# Patient Record
Sex: MALE | Race: WHITE | Employment: FULL TIME | URBAN - NONMETROPOLITAN AREA
[De-identification: names, ages, dates, MRNs, and addresses within clinical notes are randomized per-mention and may not be internally consistent; named-entity substitution may affect disease eponyms.]

---

## 2017-10-01 ENCOUNTER — APPOINTMENT (OUTPATIENT)
Dept: GENERAL RADIOLOGY | Age: 37
End: 2017-10-01

## 2017-10-01 ENCOUNTER — HOSPITAL ENCOUNTER (EMERGENCY)
Age: 37
Discharge: HOME | End: 2017-10-01

## 2017-10-01 VITALS
DIASTOLIC BLOOD PRESSURE: 72 MMHG | WEIGHT: 165 LBS | SYSTOLIC BLOOD PRESSURE: 123 MMHG | RESPIRATION RATE: 18 BRPM | OXYGEN SATURATION: 100 % | HEIGHT: 68 IN | BODY MASS INDEX: 25.01 KG/M2 | TEMPERATURE: 98.3 F | HEART RATE: 65 BPM

## 2017-10-01 DIAGNOSIS — S62.336A CLOSED FRACTURE OF NECK OF FIFTH METACARPAL BONE OF RIGHT HAND, INITIAL ENCOUNTER: Primary | ICD-10-CM

## 2017-10-01 PROCEDURE — 99283 EMERGENCY DEPT VISIT LOW MDM: CPT

## 2017-10-01 PROCEDURE — 29125 APPL SHORT ARM SPLINT STATIC: CPT

## 2017-10-01 PROCEDURE — 6370000000 HC RX 637 (ALT 250 FOR IP): Performed by: PHYSICIAN ASSISTANT

## 2017-10-01 PROCEDURE — 73130 X-RAY EXAM OF HAND: CPT

## 2017-10-01 RX ORDER — HYDROCODONE BITARTRATE AND ACETAMINOPHEN 5; 325 MG/1; MG/1
1 TABLET ORAL ONCE
Status: COMPLETED | OUTPATIENT
Start: 2017-10-01 | End: 2017-10-01

## 2017-10-01 RX ORDER — HYDROCODONE BITARTRATE AND ACETAMINOPHEN 5; 325 MG/1; MG/1
1 TABLET ORAL EVERY 6 HOURS PRN
Qty: 10 TABLET | Refills: 0 | Status: SHIPPED | OUTPATIENT
Start: 2017-10-01

## 2017-10-01 RX ADMIN — HYDROCODONE BITARTRATE AND ACETAMINOPHEN 1 TABLET: 5; 325 TABLET ORAL at 14:21

## 2017-10-01 ASSESSMENT — PAIN DESCRIPTION - PAIN TYPE: TYPE: ACUTE PAIN

## 2017-10-01 ASSESSMENT — ENCOUNTER SYMPTOMS
EYE DISCHARGE: 0
DIARRHEA: 0
EYE REDNESS: 0
NAUSEA: 0
ABDOMINAL PAIN: 0
VOMITING: 0
SORE THROAT: 0
COUGH: 0
WHEEZING: 0
SHORTNESS OF BREATH: 0
BACK PAIN: 0
RHINORRHEA: 0

## 2017-10-01 ASSESSMENT — PAIN DESCRIPTION - FREQUENCY: FREQUENCY: CONTINUOUS

## 2017-10-01 ASSESSMENT — PAIN DESCRIPTION - ORIENTATION: ORIENTATION: RIGHT

## 2017-10-01 ASSESSMENT — PAIN DESCRIPTION - DESCRIPTORS: DESCRIPTORS: THROBBING;SHARP

## 2017-10-01 ASSESSMENT — PAIN DESCRIPTION - LOCATION: LOCATION: HAND

## 2017-10-01 ASSESSMENT — PAIN SCALES - GENERAL: PAINLEVEL_OUTOF10: 5

## 2017-10-01 NOTE — ED AVS SNAPSHOT
After Visit Summary  (Discharge Instructions)    Medication List for Home    Based on the information you provided to us as well as any changes during this visit, the following is your updated medication list.  Compare this with your prescription bottles at home. If you have any questions or concerns, contact your primary care physician's office. Daily Medication List (This medication list can be shared with any Healthcare provider who is helping you manage your medications)      There are NEW medications for you. START taking them after you leave the hospital     HYDROcodone-acetaminophen 5-325 MG per tablet   Commonly known as:  NORCO   Take 1 tablet by mouth every 6 hours as needed for Pain  WARNING: This medication may make you drowsy. Do not operate heavy machinery or motor vehicles during use. Herman [place] your doctor about these medications if you have questions     lurasidone 40 MG Tabs tablet   Commonly known as:  LATUDA   Take 1 tablet by mouth daily. naproxen 500 MG tablet   Commonly known as:  NAPROSYN   Take 1 tablet by mouth 2 times daily for 14 doses       OXcarbazepine 600 MG tablet   Commonly known as:  TRILEPTAL   Take 1 tablet by mouth 2 times daily. traZODone 100 MG tablet   Commonly known as:  DESYREL   Take 1 tablet by mouth nightly. venlafaxine 150 MG extended release capsule   Commonly known as:  EFFEXOR XR   Take 1 capsule by mouth daily (with breakfast). Where to Get Your Medications      You can get these medications from any pharmacy     Bring a paper prescription for each of these medications     HYDROcodone-acetaminophen 5-325 MG per tablet               Allergies as of 10/1/2017        Reactions    Aspirin Anaphylaxis    Penicillins Anaphylaxis    Tramadol Nausea And Vomiting      Immunizations as of 10/1/2017     No immunizations on file. After Visit Summary    This summary was created for you. Thank you for entrusting your care to us. The following information includes details about your hospital/visit stay along with steps you should take to help with your recovery once you leave the hospital.  In this packet, you will find information about the topics listed below:    · Instructions about your medications including a list of your home medications  · A summary of your hospital visit  · Follow-up appointments once you have left the hospital  · Your care plan at home      You may receive a survey regarding the care you received during your stay. Your input is valuable to us. We encourage you to complete and return your survey in the envelope provided. We hope you will choose us in the future for your healthcare needs. Patient Information     Patient Name JULIO Cleaning 1980      Care Provided at:     Name Address Phone       [address] [phone]            Your Visit    Here you will find information about your visit, including the reason for your visit. Please take this sheet with you when you visit your doctor or other health care provider in the future. It will help determine the best possible medical care for you at that time. If you have any questions once you leave the hospital, please call the department phone number listed below. Diagnoses this visit     Your diagnosis was CLOSED FRACTURE OF NECK OF FIFTH METACARPAL BONE OF RIGHT HAND, INITIAL ENCOUNTER. Visit Information     Date of Visit Department Dept Phone    10/1/2017 [facility] EMERGENCY DEPT [phone]      You were seen by     You were seen by Jenna Huff PA-C. Follow-up Appointments    Below is a list of your follow-up and future appointments. This may not be a complete list as you may have made appointments directly with providers that we are not aware of or your providers may have made some for you. Please call your providers to confirm appointments. It is important to keep your appointments. Please bring your current insurance card, photo ID, co-pay, and all medication bottles to your appointment. If self-pay, payment is expected at the time of service. Follow-up Information     Follow up with [address] In 1 day. Why:  Appointment scheduled for Monday, 10-2-17 at 12:30 PM    Contact information:    [address]        Follow up with [address] [facility] [address] [address] EMERGENCY DEPT. Specialty:  Emergency Medicine    Why:  If symptoms worsen    Contact information:    [phone]      Future Appointments     10/2/2017 12:30 PM     Appointment with SCHEDULE, CHIQUI RAMOS at [address] ([phone])   Please arrive 15 minutes prior to appointment time, bring insurance card and photo ID. [facility]         Preventive Care        Date Due    HIV screening is recommended for all people regardless of risk factors  aged 15-65 years at least once (lifetime) who have never been HIV tested. 5/8/1995    Tetanus Combination Vaccine (1 - Tdap) 5/8/1999    Pneumococcal Vaccine - Pneumovax for adults aged 19-64 years with: chronic heart disease, chronic lung disease, diabetes mellitus, alcoholism, chronic liver disease, or cigarette smoking. (1 of 1 - PPSV23) 5/8/1999    Yearly Flu Vaccine (1) 9/1/2017                 Care Plan Once You Return Home    This section includes instructions you will need to follow once you leave the hospital.  Your care team will discuss these with you, so you and those caring for you know how to best care for your health needs at home. This section may also include educational information about certain health topics that may be of help to you.           Important Information if you smoke or are exposed to smoking not need to use this code after youve completed the sign-up process. If you do not sign up before the expiration date, you must request a new code. [employer] Access Code: [email or web address]  Expires: 11/30/2017  4:12 PM    4. Enter your Social Security Number ([id number]) and Date of Birth (mm/dd/yyyy) as indicated and click Submit. You will be taken to the next sign-up page. 5. Create a [employer] ID. This will be your [employer] login ID and cannot be changed, so think of one that is secure and easy to remember. 6. Create a [employer] password. You can change your password at any time. 7. Enter your Password Reset Question and Answer. This can be used at a later time if you forget your password. 8. Enter your e-mail address. You will receive e-mail notification when new information is available in [address] 9. Click Sign Up. You can now view your medical record. Additional Information  If you have questions, please contact the physician practice where you receive care. Remember, [employer] is NOT to be used for urgent needs. For medical emergencies, dial 911. For questions regarding your [employer] account call [phone]. If you have a clinical question, please call your doctor's office. View your information online  ? Review your current list of  medications, immunization, and allergies. ? Review your future test results online . ? Review your discharge instructions provided by your caregivers at discharge    Certain functionality such as prescription refills, scheduling appointments or sending messages to your provider are not activated if your provider does not use [employer] in his/her office    For questions regarding your [employer] account call [phone]. If you have a clinical question, please call your doctor's office.          The information on all pages of the After Visit Summary has been reviewed with me, the patient and/or responsible adult, by my health care provider(s). I had the opportunity to ask questions regarding this information. I understand I should dispose of my armband safely at home to protect my health information. A complete copy of the After Visit Summary has been given to me, the patient and/or responsible adult. Patient Signature/Responsible Adult: ___________________________________    Nurse Signature: ___________________________________  Resident/MLP Signature: ___________________________________  Attending Signature: ___________________________________    Date:____________Time:____________              Discharge Instructions            Broken Hand: Care Instructions  Your Care Instructions  A hand can break (fracture) during sports, a fall, or other accidents. The break may happen when your hand twists, is hit, or is used to protect you in a fall. Fractures can range from a small, hairline crack, to a bone or bones broken into two or more pieces. Your treatment depends on how bad the break is. Your doctor may have put your hand in a brace, splint, or cast to allow it to heal or to keep it stable until you see another doctor. It may take weeks or months for your hand to heal. You can help it heal with some care at home. You heal best when you take good care of yourself. Eat a variety of healthy foods, and don't smoke. Follow-up care is a key part of your treatment and safety. Be sure to make and go to all appointments, and call your doctor if you are having problems. It's also a good idea to know your test results and keep a list of the medicines you take. How can you care for yourself at home? · Put ice or a cold pack on your hand for 10 to 20 minutes at a time. Try to do this every 1 to 2 hours for the next 3 days (when you are awake). Put a thin cloth between the ice and your cast or splint. Keep your cast or splint dry. · Follow the cast care instructions your doctor gives you.  If you have a
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
## 2022-09-24 ENCOUNTER — HOSPITAL ENCOUNTER (EMERGENCY)
Age: 42
Discharge: HOME | End: 2022-09-24
Attending: EMERGENCY MEDICINE
Payer: COMMERCIAL

## 2022-09-24 VITALS
SYSTOLIC BLOOD PRESSURE: 121 MMHG | TEMPERATURE: 98.3 F | BODY MASS INDEX: 28.72 KG/M2 | RESPIRATION RATE: 20 BRPM | OXYGEN SATURATION: 94 % | WEIGHT: 183 LBS | DIASTOLIC BLOOD PRESSURE: 71 MMHG | HEART RATE: 85 BPM | HEIGHT: 67 IN

## 2022-09-24 DIAGNOSIS — S39.011A STRAIN OF MUSCLE OF RIGHT GROIN REGION: Primary | ICD-10-CM

## 2022-09-24 PROCEDURE — 99283 EMERGENCY DEPT VISIT LOW MDM: CPT

## 2022-09-24 PROCEDURE — 6370000000 HC RX 637 (ALT 250 FOR IP): Performed by: STUDENT IN AN ORGANIZED HEALTH CARE EDUCATION/TRAINING PROGRAM

## 2022-09-24 RX ORDER — IBUPROFEN 200 MG
600 TABLET ORAL ONCE
Status: COMPLETED | OUTPATIENT
Start: 2022-09-24 | End: 2022-09-24

## 2022-09-24 RX ADMIN — IBUPROFEN 600 MG: 200 TABLET, FILM COATED ORAL at 18:02

## 2022-09-24 ASSESSMENT — ENCOUNTER SYMPTOMS
ABDOMINAL PAIN: 0
NAUSEA: 0
BACK PAIN: 0
COUGH: 0
SINUS PAIN: 0
EYE REDNESS: 0
SORE THROAT: 0
SHORTNESS OF BREATH: 0
RHINORRHEA: 0
DIARRHEA: 0
VOMITING: 0

## 2022-09-24 NOTE — Clinical Note
Leopold Kinder was seen and treated in our emergency department on 9/24/2022. He may return to work on 09/25/2022. If you have any questions or concerns, please don't hesitate to call.       Nikolay Cortes MD

## 2022-09-24 NOTE — ED NOTES
Patient to ED after pulling a groin muscle while at work yesterday      Patel Mota [facility]  09/24/22 6199

## 2022-09-24 NOTE — ED PROVIDER NOTES
[place] ENCOUNTER          Pt Name: Karthik Montgomery  MRN: [id number]  [place] 1980  Date of evaluation: 9/24/2022  Treating Resident Physician: Ousmane Ybarra MD  Supervising Physician: Dr Alia Velasquez       Chief Complaint   Patient presents with    Groin Pain     History obtained from the patient. HISTORY OF PRESENT ILLNESS    HPI  Karthik Montgomery is a 43 y.o. male who presents to the emergency department for evaluation of right groin strain. Patient states he was lifting oral pain yesterday and strained right of his groin. He is has some pain with exertion with twisting movements. Denies any nausea vomiting diarrhea. Denies any testicular pain penile swelling penile discharge. The patient has no other acute complaints at this time. REVIEW OF SYSTEMS   Review of Systems   Constitutional:  Negative for chills and fever. HENT:  Negative for rhinorrhea, sinus pain and sore throat. Eyes:  Negative for redness. Respiratory:  Negative for cough and shortness of breath. Cardiovascular:  Negative for chest pain. Gastrointestinal:  Negative for abdominal pain, diarrhea, nausea and vomiting. Genitourinary:  Negative for dysuria. Musculoskeletal:  Negative for back pain. Skin:  Negative for rash. Neurological:  Negative for light-headedness and headaches. Psychiatric/Behavioral:  Negative for agitation. PAST MEDICAL AND SURGICAL HISTORY     Past Medical History:   Diagnosis Date    Psychiatric problem      No past surgical history on file. MEDICATIONS     Current Facility-Administered Medications:     ibuprofen (ADVIL;MOTRIN) tablet 600 mg, 600 mg, Oral, Once, Ousmane Ybarra MD    Current Outpatient Medications:     HYDROcodone-acetaminophen (NORCO) 5-325 MG per tablet, Take 1 tablet by mouth every 6 hours as needed for Pain  WARNING: This medication may make you drowsy.  Do not operate heavy machinery or motor vehicles during use. ., Disp: 10 tablet, Rfl: 0    naproxen (NAPROSYN) 500 MG tablet, Take 1 tablet by mouth 2 times daily for 14 doses, Disp: 14 tablet, Rfl: 0    OXcarbazepine (TRILEPTAL) 600 MG tablet, Take 1 tablet by mouth 2 times daily. , Disp: 60 tablet, Rfl: 0    traZODone (DESYREL) 100 MG tablet, Take 1 tablet by mouth nightly., Disp: 30 tablet, Rfl: 0    venlafaxine (EFFEXOR-XR) 150 MG XR capsule, Take 1 capsule by mouth daily (with breakfast). , Disp: 30 capsule, Rfl: 0    lurasidone (LATUDA) 40 MG TABS tablet, Take 1 tablet by mouth daily. , Disp: 30 tablet, Rfl: 0      SOCIAL HISTORY     Social History     Social History Narrative    Not on file     Social History     Tobacco Use    Smoking status: Every Day     Packs/day: 1.00     Years: 20.00     Pack years: 20.00     Types: Cigarettes   Substance Use Topics    Alcohol use: Yes     Comment: socially    Drug use: No         ALLERGIES     Allergies   Allergen Reactions    Aspirin Anaphylaxis    Penicillins Anaphylaxis    Tramadol Nausea And Vomiting         FAMILY HISTORY   No family history on file. PREVIOUS RECORDS   Previous records reviewed: Patient seen on 10/1/2017 for closed fracture of the neck of the fifth metacarpal bone of the right hand. PHYSICAL EXAM     ED Triage Vitals [09/24/22 1741]   BP Temp Temp Source Heart Rate Resp SpO2 Height Weight   121/71 98.3 °F (36.8 °C) Oral 85 20 94 % 5' 7\" (1.702 m) 183 lb (83 kg)     Initial vital signs and nursing assessment reviewed and normal. Pulsoximetry is normal per my interpretation. Additional Vital Signs:  Vitals:    09/24/22 1741   BP: 121/71   Pulse: 85   Resp: 20   Temp: 98.3 °F (36.8 °C)   SpO2: 94%       Physical Exam  Vitals and nursing note reviewed. Exam conducted with a chaperone present. Constitutional:       General: He is not in acute distress. Appearance: Normal appearance. He is not ill-appearing, toxic-appearing or diaphoretic. HENT:      Head: Normocephalic and atraumatic. Right Ear: External ear normal.      Left Ear: External ear normal.      Nose: Nose normal.      Mouth/Throat:      Mouth: Mucous membranes are moist.      Pharynx: Oropharynx is clear. Eyes:      General: No scleral icterus. Conjunctiva/sclera: Conjunctivae normal.   Cardiovascular:      Rate and Rhythm: Normal rate and regular rhythm. Pulses: Normal pulses. Heart sounds: Normal heart sounds. Pulmonary:      Effort: Pulmonary effort is normal. No respiratory distress. Breath sounds: Normal breath sounds. Abdominal:      General: Abdomen is flat. There is no distension. Palpations: Abdomen is soft. Tenderness: There is abdominal tenderness. There is no guarding or rebound. Hernia: There is no hernia in the left inguinal area or right inguinal area. Comments: Tenderness to the right inguinal region   Genitourinary:     Penis: Normal.       Testes: Normal.         Right: Mass, tenderness, swelling, testicular hydrocele or varicocele not present. Right testis is descended. Cremasteric reflex is present. Left: Mass, tenderness, swelling, testicular hydrocele or varicocele not present. Left testis is descended. Cremasteric reflex is present. Epididymis:      Right: Normal.      Left: Normal.      Rectum: Normal.   Musculoskeletal:         General: Normal range of motion. Cervical back: Normal range of motion and neck supple. No rigidity. No muscular tenderness. Lymphadenopathy:      Cervical: No cervical adenopathy. Skin:     General: Skin is warm and dry. Capillary Refill: Capillary refill takes less than 2 seconds. Coloration: Skin is not jaundiced. Neurological:      General: No focal deficit present. Mental Status: He is alert and oriented to person, place, and time.    Psychiatric:         Mood and Affect: Mood normal.         Behavior: Behavior normal.           MEDICAL DECISION MAKING      Differential Diagnosis Considered but not limited to:   Muscle strain, hernia        Assessment:   Patient has no signs of hernia on examination, normal testicular exam.  He has no systemic symptoms and is well-appearing he is advised take Motrin Tylenol for pain control as well as use heat for his musculoskeletal strain. He was his family physician next 2 days for further evaluation. ED RESULTS   Laboratory results:  Labs Reviewed - No data to display    Radiologic studies results:  No orders to display       ED Medications administered this visit:   Medications   ibuprofen (ADVIL;MOTRIN) tablet 600 mg (has no administration in time range)         ED COURSE      Strict return precautions and follow up instructions were discussed with the patient prior to discharge, with which the patient agrees. MEDICATION CHANGES     New Prescriptions    No medications on file         FINAL DISPOSITION     Final diagnoses:   Strain of muscle of right groin region     Condition: condition: good  Dispo: Discharge to home      This transcription was electronically signed. Parts of this transcriptions may have been dictated by use of voice recognition software and electronically transcribed, and parts may have been transcribed with the assistance of an ED scribe. The transcription may contain errors not detected in proofreading. Please refer to my supervising physician's documentation if my documentation differs.     Electronically Signed: Joe Morrissey MD, 09/24/22, 6:02 PM         Joe Morrissey MD  Resident  09/24/22 7754

## 2022-09-24 NOTE — DISCHARGE INSTRUCTIONS
You may take Motrin every 6 hours Tylenol every 6 hours as needed for pain control. Use heat to the affected area. Follow-up with primary care physician next 2 days for further evaluation. Return to the emerged department for any new or worsening symptoms.

## 2022-11-26 ENCOUNTER — HOSPITAL ENCOUNTER (EMERGENCY)
Age: 42
Discharge: HOME | End: 2022-11-26

## 2022-11-26 ENCOUNTER — APPOINTMENT (OUTPATIENT)
Dept: GENERAL RADIOLOGY | Age: 42
End: 2022-11-26

## 2022-11-26 VITALS
RESPIRATION RATE: 16 BRPM | OXYGEN SATURATION: 96 % | HEART RATE: 73 BPM | SYSTOLIC BLOOD PRESSURE: 126 MMHG | TEMPERATURE: 97.9 F | DIASTOLIC BLOOD PRESSURE: 78 MMHG

## 2022-11-26 DIAGNOSIS — M79.672 LEFT FOOT PAIN: ICD-10-CM

## 2022-11-26 DIAGNOSIS — S60.352A FOREIGN BODY OF LEFT THUMB, INITIAL ENCOUNTER: Primary | ICD-10-CM

## 2022-11-26 PROCEDURE — 73630 X-RAY EXAM OF FOOT: CPT

## 2022-11-26 PROCEDURE — 73140 X-RAY EXAM OF FINGER(S): CPT

## 2022-11-26 PROCEDURE — 99283 EMERGENCY DEPT VISIT LOW MDM: CPT

## 2022-11-26 ASSESSMENT — PAIN SCALES - GENERAL: PAINLEVEL_OUTOF10: 5

## 2022-11-26 ASSESSMENT — PAIN - FUNCTIONAL ASSESSMENT: PAIN_FUNCTIONAL_ASSESSMENT: 0-10

## 2022-11-26 NOTE — Clinical Note
Bartolo Smith was seen and treated in our emergency department on 11/26/2022. He may return to work on 11/29/2022. If you have any questions or concerns, please don't hesitate to call.       Joyce Mccrary PA-C

## 2022-11-26 NOTE — ED TRIAGE NOTES
Pt to the ED for possible glass in left thumb and lump on left foot. States the lump on his foot has been there 4-5 months. caffeine

## 2022-11-27 NOTE — ED PROVIDER NOTES
[facility]  eMERGENCY dEPARTMENT eNCOUnter          [address]       Chief Complaint   Patient presents with    Finger Pain    Foot Pain       Nurses Notes reviewed and I agree except as noted inthe HPI. HISTORY OF PRESENT ILLNESS    Camacho Dos Santos is a 43 y.o. male who presents to the Emergency Department for the evaluation of possible foreign body in the left thumb as well as some chronic pain in the left foot. Patient states that 3 weeks ago he was picking some trash up off of the floor and sustained a laceration to his left thumb. States he question whether there was glass in it at the time but he could not feel any when he rubbed his hand over the area. He will allow the area to heal but has developed pain with any pressure to the area which he describes as a stabbing sensation as if there is a foreign body present. 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 No swelling, fevers, drainage from the area. He is concerned there might be glass in the wound. He also complains of some chronic pain in the right foot which has been present for the past 4 to 5 months. He states he has developed a knot in the area. It did start after he got some new work boots which are steel toed boots. After the onset of the knot, he did get a bigger size shoe but the area has not resolved. States he has no change in his symptoms today but just wants it looked at. He has not tried anything for home treatment of either condition. Denies established PCP. The HPI was provided by the patient. REVIEW OF SYSTEMS     Review of Systems   Musculoskeletal:  Positive for arthralgias. Neurological:  Negative for weakness and numbness. All other systems reviewed and are negative. PAST MEDICAL HISTORY    has a past medical history of Psychiatric problem.     SURGICAL HISTORY      has no past surgical history on file. CURRENT MEDICATIONS       Discharge Medication List as of 11/26/2022  3:53 PM        CONTINUE these medications which have NOT CHANGED    Details   HYDROcodone-acetaminophen (NORCO) 5-325 MG per tablet Take 1 tablet by mouth every 6 hours as needed for Pain  WARNING: This medication may make you drowsy. Do not operate heavy machinery or motor vehicles during use. ., Disp-10 tablet, R-0Print      naproxen (NAPROSYN) 500 MG tablet Take 1 tablet by mouth 2 times daily for 14 doses, Disp-14 tablet, R-0      OXcarbazepine (TRILEPTAL) 600 MG tablet Take 1 tablet by mouth 2 times daily. , Disp-60 tablet, R-0      traZODone (DESYREL) 100 MG tablet Take 1 tablet by mouth nightly., Disp-30 tablet, R-0      venlafaxine (EFFEXOR-XR) 150 MG XR capsule Take 1 capsule by mouth daily (with breakfast). , Disp-30 capsule, R-0      lurasidone (LATUDA) 40 MG TABS tablet Take 1 tablet by mouth daily. , Disp-30 tablet, R-0             ALLERGIES     is allergic to aspirin, penicillins, and tramadol. FAMILY HISTORY     has no family status information on file. family history is not on file. SOCIAL HISTORY      reports that he has been smoking cigarettes. He has a 20.00 pack-year smoking history. He does not have any smokeless tobacco history on file. He reports current alcohol use. He reports that he does not use drugs. PHYSICAL EXAM     INITIAL VITALS:  oral temperature is 97.9 °F (36.6 °C). His blood pressure is 126/78 and his pulse is 73. His respiration is 16 and oxygen saturation is 96%. Physical Exam  Vitals and nursing note reviewed. Constitutional:       Appearance: Normal appearance. HENT:      Head: Normocephalic and atraumatic. Eyes:      Conjunctiva/sclera: Conjunctivae normal.   Cardiovascular:      Rate and Rhythm: Normal rate. Pulmonary:      Effort: Pulmonary effort is normal. No respiratory distress. Musculoskeletal:      Cervical back: Normal range of motion. Comments: Subtle scar noted adjacent to the nail on the left thumb with localized tenderness to palpation but no erythema, warmth, swelling or decreased range of motion. There is a well circumferential 2.5 cm area of swelling along the dorsal aspect of the proximal left first metatarsal with very minimal tenderness. Mild erythema. No warmth. Skin:     General: Skin is warm and dry. Neurological:      General: No focal deficit present. Mental Status: He is alert. Psychiatric:         Mood and Affect: Mood normal.       DIFFERENTIAL DIAGNOSIS:   Differential diagnoses are discussed    DIAGNOSTIC RESULTS     EKG: All EKG's are interpreted by the Emergency Department Physician who either signs or Co-signsthis chart in the absence of a cardiologist.          RADIOLOGY: non-plain film images(s) such as CT, Ultrasound and MRI are read by the radiologist.    XR FOOT LEFT (MIN 3 VIEWS)   Final Result      No acute fracture or dislocation. Final report electronically signed by Dr. Clifford Sanderson on 11/26/2022 2:38 PM      XR FINGER LEFT (MIN 2 VIEWS)   Final Result   1. No fracture or dislocation. 2. Radiopaque foreign body as above. Final report electronically signed by Dr. Clifford Sanderson on 11/26/2022 2:36 PM          LABS:    Labs Reviewed - No data to display    EMERGENCY DEPARTMENT COURSE:   Vitals:    Vitals:    11/26/22 1356   BP: 126/78   Pulse: 73   Resp: 16   Temp: 97.9 °F (36.6 °C)   TempSrc: Oral   SpO2: 96%      6:59 AM EST: The patient was seen and evaluated. Patient presents with reassuring vital signs for complaints of some chronic pain and swelling of the left foot as well as sensation of foreign body in the left thumb. X-ray of the left thumb does show radiopaque foreign body consistent with the reported glass. Subjacent to the head of the distal phalanx. Left foot x-ray unremarkable. Results discussed with the patient.   Discussed lack of emergent procedural indication at this time. Recommend follow-up as an outpatient with orthopedics for further management. Recommend RICE for supportive care and recommend increased padding in his footwear to prevent additional friction injury. At this time there is no indication for antibiotic treatment as the patient is 3 weeks out from the time of injury with no current sign of infection. Patient is agreeable with the above plan and denied further needs upon discharge. CRITICAL CARE:   None     CONSULTS:  None    PROCEDURES:  None    FINAL IMPRESSION      1. Foreign body of left thumb, initial encounter    2. Left foot pain          DISPOSITION/PLAN   Discharge    PATIENT REFERRED TO:  [address]  [place] [address] [address] [facility] [phone]  In 2 days  Walk-in clinic open 7:30 AM-3:30 PM    [facility] EMERGENCY DEPT  [facility]  [phone]    If symptoms worsen      DISCHARGEMEDICATIONS:  Discharge Medication List as of 11/26/2022  3:53 PM          (Please note that portions of this note were completedwith a voice recognition program.  Efforts were made to edit the dictations but occasionally words are mis-transcribed.)        Shawn Hopkins PA-C  11/27/22 2514

## 2023-02-03 ENCOUNTER — OFFICE VISIT (OUTPATIENT)
Dept: FAMILY MEDICINE CLINIC | Age: 43
End: 2023-02-03

## 2023-02-03 VITALS
BODY MASS INDEX: 26.71 KG/M2 | RESPIRATION RATE: 18 BRPM | HEART RATE: 66 BPM | OXYGEN SATURATION: 96 % | HEIGHT: 67 IN | TEMPERATURE: 97.8 F | WEIGHT: 170.2 LBS | SYSTOLIC BLOOD PRESSURE: 122 MMHG | DIASTOLIC BLOOD PRESSURE: 82 MMHG

## 2023-02-03 DIAGNOSIS — R22.42 MASS OF LEFT FOOT: ICD-10-CM

## 2023-02-03 DIAGNOSIS — F31.81 BIPOLAR II DISORDER (HCC): ICD-10-CM

## 2023-02-03 DIAGNOSIS — M25.531 PAIN IN BOTH WRISTS: ICD-10-CM

## 2023-02-03 DIAGNOSIS — M25.532 PAIN IN BOTH WRISTS: ICD-10-CM

## 2023-02-03 DIAGNOSIS — Z76.89 ENCOUNTER TO ESTABLISH CARE: Primary | ICD-10-CM

## 2023-02-03 DIAGNOSIS — F17.200 SMOKING ADDICTION: ICD-10-CM

## 2023-02-03 SDOH — ECONOMIC STABILITY: FOOD INSECURITY: WITHIN THE PAST 12 MONTHS, THE FOOD YOU BOUGHT JUST DIDN'T LAST AND YOU DIDN'T HAVE MONEY TO GET MORE.: NEVER TRUE

## 2023-02-03 SDOH — ECONOMIC STABILITY: HOUSING INSECURITY
IN THE LAST 12 MONTHS, WAS THERE A TIME WHEN YOU DID NOT HAVE A STEADY PLACE TO SLEEP OR SLEPT IN A SHELTER (INCLUDING NOW)?: NO

## 2023-02-03 SDOH — ECONOMIC STABILITY: FOOD INSECURITY: WITHIN THE PAST 12 MONTHS, YOU WORRIED THAT YOUR FOOD WOULD RUN OUT BEFORE YOU GOT MONEY TO BUY MORE.: NEVER TRUE

## 2023-02-03 SDOH — ECONOMIC STABILITY: INCOME INSECURITY: HOW HARD IS IT FOR YOU TO PAY FOR THE VERY BASICS LIKE FOOD, HOUSING, MEDICAL CARE, AND HEATING?: NOT HARD AT ALL

## 2023-02-03 ASSESSMENT — PATIENT HEALTH QUESTIONNAIRE - PHQ9
9. THOUGHTS THAT YOU WOULD BE BETTER OFF DEAD, OR OF HURTING YOURSELF: 0
6. FEELING BAD ABOUT YOURSELF - OR THAT YOU ARE A FAILURE OR HAVE LET YOURSELF OR YOUR FAMILY DOWN: 0
SUM OF ALL RESPONSES TO PHQ9 QUESTIONS 1 & 2: 0
SUM OF ALL RESPONSES TO PHQ QUESTIONS 1-9: 6
SUM OF ALL RESPONSES TO PHQ QUESTIONS 1-9: 6
4. FEELING TIRED OR HAVING LITTLE ENERGY: 0
SUM OF ALL RESPONSES TO PHQ QUESTIONS 1-9: 6
7. TROUBLE CONCENTRATING ON THINGS, SUCH AS READING THE NEWSPAPER OR WATCHING TELEVISION: 0
8. MOVING OR SPEAKING SO SLOWLY THAT OTHER PEOPLE COULD HAVE NOTICED. OR THE OPPOSITE, BEING SO FIGETY OR RESTLESS THAT YOU HAVE BEEN MOVING AROUND A LOT MORE THAN USUAL: 1
3. TROUBLE FALLING OR STAYING ASLEEP: 3
5. POOR APPETITE OR OVEREATING: 2
2. FEELING DOWN, DEPRESSED OR HOPELESS: 0
1. LITTLE INTEREST OR PLEASURE IN DOING THINGS: 0
SUM OF ALL RESPONSES TO PHQ QUESTIONS 1-9: 6
10. IF YOU CHECKED OFF ANY PROBLEMS, HOW DIFFICULT HAVE THESE PROBLEMS MADE IT FOR YOU TO DO YOUR WORK, TAKE CARE OF THINGS AT HOME, OR GET ALONG WITH OTHER PEOPLE: 1

## 2023-02-03 NOTE — PROGRESS NOTES
[address]   Dept: [phone]  Loc: Alex Gomez (:  1980) is a 43 y.o. [facility] patient, here for evaluation of the following chief complaint(s):  New Patient (establish)      ASSESSMENT/PLAN:  1. Encounter to establish care  - 55-year-old male with past medical history reported for hypercholesterolemia, bipolar disorder and anxiety. -     CBC with Auto Differential; Future  -     Comprehensive Metabolic Panel; Future  -     Lipid, Fasting; Future  -     TSH With Reflex Ft4; Future  2. Pain in both wrists  - Chronic wrist pain with numbness in the first 3 digits bilaterally likely representing carpal tunnel. 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 .  Patient to start with wrist splints bilaterally at night and will obtain nerve conduction test of upper extremities bilaterally. -     Elastic Bandages & Supports (WRIST SPLINT/COCK-UP/LEFT L) MISC; Nightly Starting Fri 2/3/2023, Disp-1 each, R-0, Print  -     Elastic Bandages & Supports (WRIST SPLINT/COCK-UP/RIGHT L) MISC; Nightly Starting Fri 2/3/2023, Disp-1 each, R-0, Print  -     Nerve conduction test; Future  3. Smoking addiction  - 20+ years smoking average 1.5 PPD, currently smoking 1/2 pack/day. Will obtain CT lung screen as well as start Chantix as patient interested in smoking cessation. Discussed CT lung screen given smoking history with the patient, patient agrees with screening for lung cancer.  -     Varenicline Tartrate, Starter, 0.5 MG X 11 & 1 MG X 42 TBPK; Take 1 Package by mouth daily, Disp-1 each, R-0Normal  4. Mass of left foot  - Hard palpable mass on the left first proximal metatarsal that is tender to palpation.   On x-ray of the left foot from 2022 there is a ossified mass visualized adjacent to the first interphalangeal joint.  -     [facility] - Kenan Waddell DPM, Podiatry, [address]  5. Bipolar II disorder ([facility] [address] [facility]  -Patient to return to [facility] clinic for reevaluation and treatment of bipolar disorder, has been there before, for evaluation and treatment of bipolar. Will hold on restarting lithium at this time as patient has not been taking lithium for quite some time, likely years according to the patient. Patient was comfortable with and agrees with this plan. Return in about 1 month (around 3/3/2023). SUBJECTIVE/OBJECTIVE:  HPI  Presents for check up and establish care. Was previously at [address] [place] [address] with Сергей Rizvi CNP, has not been there in years. Was at [facility] from prior alcohol abuse in the past.  Hx of high cholesterol, bipolar, anxiety. 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 Both wrists tender with numbness in the fingers. Bump on left foot started in June, thinks from wearing boots. Slowly getting bigger and painful. Had glass in left thumb pad in October, saw ED with x-ray, came out about 2 weeks ago and feels better. Medications: currently none, was taking unknown dose of lithium and \"something for anxiety. \"  Erratic spending episode 2 weeks ago. Intermittent 4-5 times a year when off medications. Alcohol use: one drink since released from correctional facility. Tobacco: Restarted smoking due to stress at half way house on release from correctional facility. Initially quit while in long term. Was previously smoking 1.5 PPD for about 20 years, currently 1/2 PPD. Currently interested in smoking cessation. Illicit substances: denies. Review of Systems  Constitutional: Negative for chills, diaphoresis and fever. HENT: Negative for congestion and sore throat. Eyes: Negative for redness and visual disturbance. Respiratory: Negative for cough, chest tightness and shortness of breath. Cardiovascular: Negative for palpitations and leg swelling. Gastrointestinal: Negative for abdominal distention, abdominal pain and nausea. Genitourinary: Negative for difficulty urinating and dysuria. Musculoskeletal: left>right wrist discomfort with numbness in the first 3 digits bilaterally. Left thumb bump from glass that came out. Left foot bump. Skin: Negative for color change and pallor. Neurological: Negative for dizziness and light-headedness. Psychiatric/Behavioral: Negative for agitation and confusion. The patient is not nervous/anxious    Physical Exam  Constitutional: Negative for chills, diaphoresis and fever. HENT: Negative for congestion and sore throat. Eyes: Negative for redness and visual disturbance. Respiratory: Negative for cough, chest tightness and shortness of breath. Cardiovascular: Negative for palpitations and leg swelling. Gastrointestinal: Negative for abdominal distention, abdominal pain and nausea. Genitourinary: Negative for difficulty urinating and dysuria. Musculoskeletal: Bilateral wrist tender to palpation. Healing lesion on left thumb pad. Hard lesion on left first proximal metatarsal that is tender to palpation. Negative for back pain and neck pain. Skin: Negative for color change and pallor. Neurological: Negative for dizziness and light-headedness. Psychiatric/Behavioral: Negative for agitation and confusion. The patient is not nervous/anxious      Vitals:    02/03/23 0841   BP: 122/82   Site: Right Upper Arm   Position: Sitting   Cuff Size: Medium Adult   Pulse: 66   Resp: 18   Temp: 97.8 °F (36.6 °C)   TempSrc: Oral   SpO2: 96%   Weight: 170 lb 3.2 oz (77.2 kg)   Height: 5' 7\" (1.702 m)         An electronic signature was used to authenticate this note.     --Gertrudis Petersen MD

## 2023-02-03 NOTE — PROGRESS NOTES
Health Maintenance Due   Topic Date Due    COVID-19 Vaccine (1) Never done    Varicella vaccine (1 of 2 - 2-dose childhood series) Never done    Pneumococcal 0-64 years Vaccine (1 - PCV) Never done    Depression Monitoring  Never done    HIV screen  Never done    Hepatitis C screen  Never done    DTaP/Tdap/Td vaccine (1 - Tdap) Never done    Diabetes screen  Never done    Lipids  Never done    Flu vaccine (1) Never done

## 2023-02-08 ENCOUNTER — TELEPHONE (OUTPATIENT)
Dept: FAMILY MEDICINE CLINIC | Age: 43
End: 2023-02-08

## 2023-02-08 NOTE — TELEPHONE ENCOUNTER
Called  patient to inform him and, he said he doesn't mind paying out of pocket for the lung screening.

## 2023-02-08 NOTE — TELEPHONE ENCOUNTER
Just talk to [facility] and they said patient is not old enough to do the lung screening, patient has to be 55+ Insurance won't cover. He will have to pay out of pocket if he needs this test done.

## 2023-02-09 ENCOUNTER — HOSPITAL ENCOUNTER (EMERGENCY)
Age: 43
Discharge: HOME | End: 2023-02-09
Attending: EMERGENCY MEDICINE
Payer: COMMERCIAL

## 2023-02-09 ENCOUNTER — APPOINTMENT (OUTPATIENT)
Dept: GENERAL RADIOLOGY | Age: 43
End: 2023-02-09
Payer: COMMERCIAL

## 2023-02-09 VITALS
SYSTOLIC BLOOD PRESSURE: 125 MMHG | DIASTOLIC BLOOD PRESSURE: 72 MMHG | OXYGEN SATURATION: 98 % | RESPIRATION RATE: 18 BRPM | HEART RATE: 77 BPM | TEMPERATURE: 98 F

## 2023-02-09 DIAGNOSIS — J40 BRONCHITIS: Primary | ICD-10-CM

## 2023-02-09 LAB
ANION GAP SERPL CALC-SCNC: 11 MEQ/L (ref 8–16)
BASOPHILS ABSOLUTE: 0.1 THOU/MM3 (ref 0–0.1)
BASOPHILS NFR BLD AUTO: 0.5 %
BUN SERPL-MCNC: 17 MG/DL (ref 7–22)
CALCIUM SERPL-MCNC: 9.1 MG/DL (ref 8.5–10.5)
CHLORIDE SERPL-SCNC: 106 MEQ/L (ref 98–111)
CO2 SERPL-SCNC: 22 MEQ/L (ref 23–33)
CREAT SERPL-MCNC: 0.8 MG/DL (ref 0.4–1.2)
DEPRECATED RDW RBC AUTO: 41.2 FL (ref 35–45)
EKG ATRIAL RATE: 84 BPM
EKG P AXIS: 38 DEGREES
EKG P-R INTERVAL: 122 MS
EKG Q-T INTERVAL: 360 MS
EKG QRS DURATION: 92 MS
EKG QTC CALCULATION (BAZETT): 425 MS
EKG R AXIS: 79 DEGREES
EKG T AXIS: 48 DEGREES
EKG VENTRICULAR RATE: 84 BPM
EOSINOPHIL NFR BLD AUTO: 1.8 %
EOSINOPHILS ABSOLUTE: 0.2 THOU/MM3 (ref 0–0.4)
ERYTHROCYTE [DISTWIDTH] IN BLOOD BY AUTOMATED COUNT: 13.2 % (ref 11.5–14.5)
FLUAV RNA RESP QL NAA+PROBE: NOT DETECTED
FLUBV RNA RESP QL NAA+PROBE: NOT DETECTED
GFR SERPL CREATININE-BSD FRML MDRD: > 60 ML/MIN/1.73M2
GLUCOSE SERPL-MCNC: 110 MG/DL (ref 70–108)
HCT VFR BLD AUTO: 46.1 % (ref 42–52)
HGB BLD-MCNC: 15.7 GM/DL (ref 14–18)
IMM GRANULOCYTES # BLD AUTO: 0.04 THOU/MM3 (ref 0–0.07)
IMM GRANULOCYTES NFR BLD AUTO: 0.4 %
LYMPHOCYTES ABSOLUTE: 2.3 THOU/MM3 (ref 1–4.8)
LYMPHOCYTES NFR BLD AUTO: 20.6 %
MCH RBC QN AUTO: 29.6 PG (ref 26–33)
MCHC RBC AUTO-ENTMCNC: 34.1 GM/DL (ref 32.2–35.5)
MCV RBC AUTO: 87 FL (ref 80–94)
MONOCYTES ABSOLUTE: 0.8 THOU/MM3 (ref 0.4–1.3)
MONOCYTES NFR BLD AUTO: 7.6 %
NEUTROPHILS NFR BLD AUTO: 69.1 %
NRBC BLD AUTO-RTO: 0 /100 WBC
PLATELET # BLD AUTO: 262 THOU/MM3 (ref 130–400)
PMV BLD AUTO: 11.4 FL (ref 9.4–12.4)
POTASSIUM SERPL-SCNC: 4.1 MEQ/L (ref 3.5–5.2)
RBC # BLD AUTO: 5.3 MILL/MM3 (ref 4.7–6.1)
SARS-COV-2 RNA RESP QL NAA+PROBE: NOT DETECTED
SEGMENTED NEUTROPHILS ABSOLUTE COUNT: 7.7 THOU/MM3 (ref 1.8–7.7)
SODIUM SERPL-SCNC: 139 MEQ/L (ref 135–145)
TROPONIN T: < 0.01 NG/ML
WBC # BLD AUTO: 11.1 THOU/MM3 (ref 4.8–10.8)

## 2023-02-09 PROCEDURE — 87636 SARSCOV2 & INF A&B AMP PRB: CPT

## 2023-02-09 PROCEDURE — 71045 X-RAY EXAM CHEST 1 VIEW: CPT

## 2023-02-09 PROCEDURE — 99285 EMERGENCY DEPT VISIT HI MDM: CPT

## 2023-02-09 PROCEDURE — 93010 ELECTROCARDIOGRAM REPORT: CPT | Performed by: INTERNAL MEDICINE

## 2023-02-09 PROCEDURE — 36415 COLL VENOUS BLD VENIPUNCTURE: CPT

## 2023-02-09 PROCEDURE — 93005 ELECTROCARDIOGRAM TRACING: CPT | Performed by: EMERGENCY MEDICINE

## 2023-02-09 PROCEDURE — 84484 ASSAY OF TROPONIN QUANT: CPT

## 2023-02-09 PROCEDURE — 80048 BASIC METABOLIC PNL TOTAL CA: CPT

## 2023-02-09 PROCEDURE — 85025 COMPLETE CBC W/AUTO DIFF WBC: CPT

## 2023-02-09 ASSESSMENT — HEART SCORE: ECG: 0

## 2023-02-09 NOTE — Clinical Note
Marissa Thompson was seen and treated in our emergency department on 2/9/2023. He may return to work on 02/10/2023. If you have any questions or concerns, please don't hesitate to call.       Nova Robertson, DO

## 2023-02-09 NOTE — ED PROVIDER NOTES
[address] [facility] [address] [address] EMERGENCY DEPT      EMERGENCY MEDICINE     Pt Name: Evelyn Glover  MRN: [id number]  [place] 1980  Date of evaluation: 2/9/2023  Provider: Rik Sy DO  Supervising Physician: Coleman Deshpande DO    CHIEF COMPLAINT       Chief Complaint   Patient presents with    Cough    Chest Congestion     HISTORY OF PRESENT [address] is a pleasant 43 y.o. male, previously healthy, who presents to the emergency department from home for chest tightness and nasal congestion that started this morning. Patient has also been coughing up phlegm. The tightness is described as a pressure. He does smoke tobacco but denies any previous medical history. Patient denies any recent long travel, recent surgery, fever, vomiting, sore throat. PASTMEDICAL HISTORY     Past Medical History:   Diagnosis Date    Psychiatric problem        Patient Active Problem List   Diagnosis Code    [facility] F31.81     SURGICAL HISTORY       Past Surgical History:   Procedure Laterality Date    TYMPANOSTOMY TUBE PLACEMENT Bilateral     as a kid       CURRENT MEDICATIONS       Previous Medications    ELASTIC BANDAGES & SUPPORTS (WRIST SPLINT/COCK-UP/LEFT L) MISC    1 each by Does not apply route at bedtime    ELASTIC BANDAGES & SUPPORTS (WRIST SPLINT/COCK-UP/RIGHT L) MISC    1 each by Does not apply route at bedtime    VARENICLINE TARTRATE, STARTER, 0.5 MG X 11 & 1 MG X 42 TBPK    Take 1 Package by mouth daily       ALLERGIES     is allergic to penicillins and tramadol. FAMILY HISTORY     has no family status information on file.         SOCIAL HISTORY       Social History     Tobacco Use    Smoking status: Every Day     Packs/day: 0.50     Years: 22.00     Pack years: 11.00     Types: Cigarettes     Start date: 2001    Smokeless tobacco: Never   Substance Use Topics    Alcohol use: Not Currently     Comment: socially    Drug use: No       PHYSICAL EXAM       ED Triage Vitals [02/09/23 1743]   BP Temp Temp Source Heart Rate Resp SpO2 Height Weight   125/72 98 °F (36.7 °C) Oral 77 18 98 % -- --       Physical Exam  Vitals and nursing note reviewed. Constitutional:       General: He is not in acute distress. Appearance: He is not ill-appearing or toxic-appearing. HENT:      Head: Normocephalic and atraumatic. Right Ear: External ear normal.      Left Ear: External ear normal.      Nose: Nose normal. No congestion. Mouth/Throat:      Mouth: Mucous membranes are moist.      Pharynx: Oropharynx is clear. No oropharyngeal exudate or posterior oropharyngeal erythema. Eyes:      Conjunctiva/sclera: Conjunctivae normal.   Cardiovascular:      Rate and Rhythm: Normal rate and regular rhythm. Pulses: Normal pulses. Heart sounds: Normal heart sounds. Pulmonary:      Effort: Pulmonary effort is normal. No respiratory distress. Breath sounds: Normal breath sounds. No wheezing or rhonchi. Abdominal:      General: There is no distension. Palpations: Abdomen is soft. Tenderness: There is no abdominal tenderness. There is no guarding. Musculoskeletal:         General: Normal range of motion. Cervical back: Normal range of motion and neck supple. No tenderness. Lymphadenopathy:      Cervical: No cervical adenopathy. Skin:     General: Skin is warm and dry. Capillary Refill: Capillary refill takes less than 2 seconds. Neurological:      General: No focal deficit present. Mental Status: He is alert and oriented to person, place, and time. Psychiatric:         Mood and Affect: Mood normal.       FORMAL DIAGNOSTIC RESULTS     RADIOLOGY: Interpretation per the Radiologist below, if available at the time of this note (none if blank):    XR CHEST PORTABLE   Final Result   1. No acute cardiopulmonary finding. **This report has been created using voice recognition software. It may contain minor errors which are inherent in voice recognition technology. ** Final report electronically signed by Dr Loida Aponte on 2023 6:54 PM          LABS: (none if blank)  Labs Reviewed   CBC WITH AUTO DIFFERENTIAL - Abnormal; Notable for the following components:       Result Value    WBC 11.1 (*)     All other components within normal limits   BASIC METABOLIC PANEL - Abnormal; Notable for the following components:    CO2 22 (*)     Glucose 110 (*)     All other components within normal limits   COVID-19 & INFLUENZA COMBO   TROPONIN   ANION GAP   GLOMERULAR FILTRATION RATE, ESTIMATED       (Any cultures that may have been sent were not resulted at the time of this patient visit)    [address] / ED COURSE:     1) Number and Complexity of Problems            Problem List This Visit:         Chief Complaint   Patient presents with    Cough    Chest Congestion            Differential Diagnosis includes (but not limited to): Bronchitis, COVID, influenza, viral URI, PNA, ACS            Pertinent Comorbid Conditions:    N/A    2)  Data Reviewed (none if left blank)          My Independent interpretations:     EKG:      Normal sinus rhythm. Normal rate, normal rhythm, normal axis, no ST elevation, normal QRS, normal QTc    Imaging: Chest x-ray is unremarkable for any acute abnormalities. Labs:      WBC ever so slightly elevated at 11.1. Troponin negative, COVID and flu negative. Rest of the lab work is reviewed and unremarkable                 Decision Rules/Clinical Scores utilized:  History: 0  EC  Patient Age: 1  *Risk factors for Atherosclerotic disease: Cigarette smoking  Risk Factors: 1  Troponin: 0  Heart Score Total: 1             External Documentation Reviewed:         Previous patient encounter documents & history available on EMR was reviewed progress note on 2/3/2023             See Formal Diagnostic Results above for the lab and radiology tests and orders.     3)  Treatment and Disposition         ED Reassessment:  See ED course         Case discussed with consulting clinician: N/A         Shared Decision-Making was performed and disposition discussed with the        Patient/Family and questions answered yes         Social determinants of health impacting treatment or disposition: Smokes tobacco         Code Status:  Full      Summary of Patient Presentation:      MDM  Number of Diagnoses or Management Options  Bronchitis: new, needed workup  Diagnosis management comments: Patient is a tobacco smoking, well-appearing 77-year-old male who presents for evaluation of productive cough that started today. His vitals are stable, he does not have any increased work of breathing. Lung sounds are clear to auscultation bilaterally. I think this patient is unlikely be having angina, but since he described as a pressure I decided to work-up his chest pain just to be sure. EKG normal sinus rhythm with a negative troponin. I think this is most likely a bronchitis, will discharge patient home. Amount and/or Complexity of Data Reviewed  Clinical lab tests: ordered and reviewed  Tests in the radiology section of CPT®: ordered and reviewed  Tests in the medicine section of CPT®: ordered and reviewed  Discuss the patient with other providers: yes  Independent visualization of images, tracings, or specimens: yes    Patient Progress  Patient progress: stable  /  ED Course as of 02/09/23 1927   Thu Feb 09, 2023   1915 Pt sitting in chair comfortably [AC]      ED Course User Index  [AC] Yosef Gibbons,      Vitals Reviewed:    Vitals:    02/09/23 1743   BP: 125/72   Pulse: 77   Resp: 18   Temp: 98 °F (36.7 °C)   TempSrc: Oral   SpO2: 98%       The patient was seen and examined. Appropriate diagnostic testing was performed and results reviewed with the patient. The results of pertinent diagnostic studies and exam findings were discussed.  The patients provisional diagnosis and plan of care were discussed with the patient and present family who expressed understanding. Any medications were reviewed and indications and risks of medications were discussed with the patient /family present. Strict verbal and written return precautions, instructions and appropriate follow-up provided to  the patient. ED Medications administered this visit:  (None if blank)  Medications - No data to display      PROCEDURES: (None if blank)  Procedures:     CRITICAL CARE: (None if blank)      DISCHARGE PRESCRIPTIONS: (None if blank)  New Prescriptions    No medications on file       FINAL IMPRESSION      1. Bronchitis          DISPOSITION/PLAN   DISPOSITION Decision To Discharge 02/09/2023 07:15:17 PM      OUTPATIENT FOLLOW UP THE PATIENT:  Obdulio Jackson MD  [address]  [facility]  [phone]    In 2 days  As needed    [facility] EMERGENCY DEPT  [address] [facility]  [phone]    If symptoms worsen    Arlen Broderick DO    This transcription was electronically signed. Parts of this transcriptions may have been dictated by use of voice recognition software and electronically transcribed, and parts may have been transcribed with the assistance of an ED scribe. The transcription may contain errors not detected in proofreading. Please refer to my supervising physician's documentation if my documentation differs.     Electronically Signed: Arlen Broderick DO, 02/09/23, 7:27 PM      Arlen Broderick DO  Resident  02/09/23 7298

## 2023-02-09 NOTE — ED NOTES
Pt to the ED via self. Patient presents with complaints of cough and chest tightness since this morning. EKG done. Patient is alert and oriented x 4. Respirations are regular and unlabored.      Tata Lyle RN  02/09/23 2864

## 2023-02-10 ENCOUNTER — TELEPHONE (OUTPATIENT)
Dept: FAMILY MEDICINE CLINIC | Age: 43
End: 2023-02-10

## 2023-02-10 NOTE — DISCHARGE INSTRUCTIONS
Follow up with your primary care doctor as needed. I would advise you to quit using tobacco.  Return here if your symptoms worsen.

## 2023-02-10 NOTE — LETTER
[address] [place] [address],[address] [facility] [address]  Phone: [phone]  Fax: [phone]    Roopa Araujo MD        February 10, 2023    [address] [place] [address]      Dear Rachelle Alejandro:    We have made several attempts to contact you by phone and have   been unsuccessful. Please call our office at your earliest convenience  At [phone]. Thank you. If you have any questions or concerns, please don't hesitate to call.     Sincerely,        Roopa Araujo MD

## 2023-02-10 NOTE — LETTER
[address] [place] [address],[address] [facility]  Phone: [phone]  Fax: [phone]    Oc Lugo MD        February 10, 2023    [address] [place] [address]  [address] [facility]      Dear Namrata Farfan:    [address] [facility]  [address], [address]  Phone: [phone]  Fax: [phone]    February 10, 2023    [address] [place] [address]  [address]      Dear Namrata Farfan,    This letter is regarding your Emergency Department (ED) visit at [facility] on 2/9/23. Dr. Oc Lugo wanted to make sure that you understand your discharge instructions and that you were able to fill any prescriptions that may have been ordered for you. Please contact the office at the above phone number if the ED advised you to follow up with Dr. Oc Lugo, or if you have any further questions or needs. Also did you know -   *Visiting the ED for a non-emergency could result in higher co-pays than you would normally be subject to paying? *You can call your doctor even after hours so they can direct you to the most appropriate care. [facility] [place]) practices can often offer you an appointment on the same day that you call. *We have some [facility] offices that offer Walk-in appointments; check our website for availability in your community, [email or web address].      *Evisits are now available for patients for $36 through shopatplaces for certain conditions:  * Sinus, cold and or cough       * Diarrhea            * Headache  * Heartburn                                * Poison Cecilia          * Back pain     * Urinary problems                         If you do not have shopatplaces and are interested, please contact the office and a staff member may assist you or go to [email or web address].     Sincerely,     Oc Lugo MD and your [facility] Team         If you have any questions or concerns, please don't hesitate to call.     Sincerely,        Arla Bamberger, MD

## 2023-02-25 ENCOUNTER — HOSPITAL ENCOUNTER (EMERGENCY)
Age: 43
Discharge: HOME | End: 2023-02-25
Payer: COMMERCIAL

## 2023-02-25 VITALS
TEMPERATURE: 97.9 F | HEIGHT: 67 IN | BODY MASS INDEX: 26.68 KG/M2 | HEART RATE: 74 BPM | WEIGHT: 170 LBS | DIASTOLIC BLOOD PRESSURE: 82 MMHG | RESPIRATION RATE: 18 BRPM | SYSTOLIC BLOOD PRESSURE: 116 MMHG | OXYGEN SATURATION: 98 %

## 2023-02-25 DIAGNOSIS — U07.1 COVID-19: Primary | ICD-10-CM

## 2023-02-25 LAB
FLUAV RNA RESP QL NAA+PROBE: NOT DETECTED
FLUBV RNA RESP QL NAA+PROBE: NOT DETECTED
S PYO AG THROAT QL: NEGATIVE
S PYO THROAT QL CULT: NORMAL
SARS-COV-2 RNA RESP QL NAA+PROBE: DETECTED

## 2023-02-25 PROCEDURE — 87070 CULTURE OTHR SPECIMN AEROBIC: CPT

## 2023-02-25 PROCEDURE — 87636 SARSCOV2 & INF A&B AMP PRB: CPT

## 2023-02-25 PROCEDURE — 6370000000 HC RX 637 (ALT 250 FOR IP)

## 2023-02-25 PROCEDURE — 87880 STREP A ASSAY W/OPTIC: CPT

## 2023-02-25 PROCEDURE — 99283 EMERGENCY DEPT VISIT LOW MDM: CPT

## 2023-02-25 RX ORDER — PSEUDOEPHEDRINE HCL 30 MG
30 TABLET ORAL EVERY 6 HOURS PRN
Qty: 20 TABLET | Refills: 1 | Status: SHIPPED | OUTPATIENT
Start: 2023-02-25 | End: 2023-03-07

## 2023-02-25 RX ORDER — PSEUDOEPHEDRINE HCL 120 MG/1
120 TABLET, FILM COATED, EXTENDED RELEASE ORAL 2 TIMES DAILY
Status: DISCONTINUED | OUTPATIENT
Start: 2023-02-25 | End: 2023-02-26 | Stop reason: HOSPADM

## 2023-02-25 RX ADMIN — PSEUDOEPHEDRINE HCL 120 MG: 120 TABLET, FILM COATED, EXTENDED RELEASE ORAL at 23:20

## 2023-02-25 ASSESSMENT — PAIN DESCRIPTION - LOCATION: LOCATION: EAR

## 2023-02-25 ASSESSMENT — PAIN SCALES - GENERAL: PAINLEVEL_OUTOF10: 3

## 2023-02-25 ASSESSMENT — PAIN - FUNCTIONAL ASSESSMENT: PAIN_FUNCTIONAL_ASSESSMENT: 0-10

## 2023-02-25 ASSESSMENT — PAIN DESCRIPTION - ORIENTATION: ORIENTATION: RIGHT

## 2023-02-25 NOTE — Clinical Note
Sury Zamorano was seen and treated in our emergency department on 2/25/2023. He may return to work on 02/27/2023. Please adhere to your job sites policy regarding [id number] infection. If you have any questions or concerns, please don't hesitate to call.       Flaca Patton, APRN - CNP

## 2023-02-26 LAB — BACTERIA THROAT AEROBE CULT: NORMAL

## 2023-02-26 NOTE — ED TRIAGE NOTES
Pt presents to the ER with c/o a cough and otalgia of the right ear that's started yesterday. Pt states his son has \"a double ear infection. \" Pt is alert, vss

## 2023-02-26 NOTE — DISCHARGE INSTRUCTIONS
Thank you for coming to see us. It seems that you have a COVID-19 infection. Please return to the emergency department if you have shortness of breath, fever chills or pain uncontrolled with OTC medications. I hope you are feeling better soon!

## 2023-02-26 NOTE — ED PROVIDER NOTES
[facility] [address] [address] EMERGENCY DEPT      EMERGENCY MEDICINE     Pt Name: Ashley Russell  MRN: [id number]  [address] 1980  Date of evaluation: 2/25/2023  Provider: GARIMA Falcon - FLORA    CHIEF COMPLAINT       Chief Complaint   Patient presents with    Cough    Otalgia     Right     HISTORY OF [address] is a pleasant 43 y.o. male who presents to the emergency department from home by personal transportation. Chief complaint includes cough/right ear plain that began 2 nights ago. Had subsequent chills, did not check temperature. Endorses significant congestion \"this ear was hurting (grabs right ear) but just feels full now, but has been coughing like crazy\". Denies shortness of breath, chest pain, nausea/vomiting. Denies loss of taste or smell, denies itchy eyes. Endorses diarrhea and sneezing. Son was recently treated for strep throat\" a double ear infection\". Fully vaccinated against COVID, had initial dose in 2 boosters. History obtained from patient  PASTMEDICAL HISTORY     Past Medical History:   Diagnosis Date    Psychiatric problem      Patient Active Problem List   Diagnosis Code    Bipolar II disorder ([facility] [address] F31.81     SURGICAL HISTORY       Past Surgical History:   Procedure Laterality Date    TYMPANOSTOMY TUBE PLACEMENT Bilateral     as a kid     CURRENT MEDICATIONS       Previous Medications    ELASTIC BANDAGES & SUPPORTS (WRIST SPLINT/COCK-UP/LEFT L) MISC    1 each by Does not apply route at bedtime    ELASTIC BANDAGES & SUPPORTS (WRIST SPLINT/COCK-UP/RIGHT L) MISC    1 each by Does not apply route at bedtime    VARENICLINE TARTRATE, STARTER, 0.5 MG X 11 & 1 MG X 42 TBPK    Take 1 Package by mouth daily     ALLERGIES     is allergic to penicillins and tramadol. FAMILY HISTORY     has no family status information on file.       SOCIAL HISTORY       Social History     Tobacco Use    Smoking status: Every Day     Packs/day: 0.50     Years: 22.00     Pack years: 11.00     Types: Cigarettes     Start date: 2001    Smokeless tobacco: Never   Substance Use Topics    Alcohol use: Not Currently     Comment: socially    Drug use: No     PHYSICAL EXAM       ED Triage Vitals [02/25/23 2143]   BP Temp Temp Source Heart Rate Resp SpO2 Height Weight   116/82 97.9 °F (36.6 °C) Oral 74 18 98 % 5' 7\" (1.702 m) 170 lb (77.1 kg)     Physical Exam  Constitutional:       General: He is not in acute distress. Appearance: Normal appearance. He is normal weight. He is not ill-appearing, toxic-appearing or diaphoretic. HENT:      Head: Normocephalic. Right Ear: External ear normal. There is impacted cerumen. Left Ear: Tympanic membrane, ear canal and external ear normal. There is no impacted cerumen. Nose: Congestion and rhinorrhea present. Mouth/Throat:      Lips: No lesions. Mouth: Mucous membranes are moist.      Tongue: No lesions. Tongue does not deviate from midline. Palate: No mass and lesions. Pharynx: Uvula midline. Pharyngeal swelling and posterior oropharyngeal erythema present. No oropharyngeal exudate or uvula swelling. Tonsils: No tonsillar exudate or tonsillar abscesses. 2+ on the right. 2+ on the left. Eyes:      General:         Right eye: No discharge. Left eye: No discharge. Pupils: Pupils are equal, round, and reactive to light. Cardiovascular:      Rate and Rhythm: Normal rate and regular rhythm. Pulses: Normal pulses. Heart sounds: Normal heart sounds. No murmur heard. No friction rub. Pulmonary:      Effort: Pulmonary effort is normal.      Breath sounds: Normal breath sounds. Abdominal:      General: Abdomen is flat. There is no distension. Palpations: Abdomen is soft. There is no mass. Tenderness: There is no abdominal tenderness. There is no right CVA tenderness, left CVA tenderness, guarding or rebound. Hernia: No hernia is present.    Musculoskeletal:      Cervical back: Normal range of motion and neck supple. No rigidity or tenderness. Lymphadenopathy:      Cervical: No cervical adenopathy. Skin:     General: Skin is warm and dry. Capillary Refill: Capillary refill takes less than 2 seconds. Coloration: Skin is not pale. Findings: No erythema. Neurological:      General: No focal deficit present. Mental Status: He is alert and oriented to person, place, and time. Psychiatric:         Mood and Affect: Mood normal.         Behavior: Behavior normal.     FORMAL DIAGNOSTIC RESULTS     RADIOLOGY: Interpretation per the Radiologist below, if available at the time of this note (none if blank): No orders to display     LABS: (none if blank)  Labs Reviewed   COVID-19 & INFLUENZA COMBO - Abnormal; Notable for the following components:       Result Value    SARS-CoV-2 RNA, RT PCR DETECTED (*)     All other components within normal limits   CULTURE, THROAT    Narrative:     Source: Specimen not received       Site:           Current Antibiotics:   GROUP A STREP, REFLEX     (Any cultures that may have been sent were not resulted at the time of this patient visit)    [facility] / ED COURSE:     1) Number and Complexity of Problems            Problem List This Visit:         Chief Complaint   Patient presents with    Cough    Otalgia     Right        Differential Diagnosis includes (but not limited to):  Viral respiratory illness,        Diagnoses Considered but I have low suspicion of:   Strep throat, seasonal allergies             Pertinent Comorbid Conditions:    Recent sick contacts    2)  Data Reviewed (none if left blank)          My Independent interpretations:     EKG:      N/A    Imaging: N/A    Labs:     Positive COVID infection.   No influenza, no strep throat          Decision Rules/Clinical Scores utilized:      Centor score    Age: 15/44-0 points  Exudate or swelling on tonsils: yes-+1 point  Tender/swollen anterior cervical lymph nodes: No-0 point  Temp >38 C(100.4 F): no-0 points  Cough:  Cough present-0 points    Total points: 1    1: 5-10%: No further testing or antibiotics. However, recent sick contact that was empirically positive for strep throat          External Documentation Reviewed:         Previous patient encounter documents & history available on EMR was reviewed            See Formal Diagnostic Results above for the lab and radiology tests and orders. 3)  Treatment and Disposition         ED Reassessment: Arrives in no apparent distress. Remains this way throughout emergency department stay. Shared Decision-Making was performed and disposition discussed with the        Patient/Family and questions answered. Patient requests testing for strep throat. Summary of Patient Presentation:    Symptoms consistent with respiratory viral illness, mild, no acute distress. General testing performed indicating COVID-19 infection. Instructed this is self-limiting and typically mild in somebody with COVID vaccinations. Also instructed to adhere to his workplaces [employer]. Verbalized understanding, all questions answered, teach back performed.     MDM     Amount and/or Complexity of Data Reviewed  Clinical lab tests: ordered and reviewed  Tests in the medicine section of CPT®: ordered and reviewed  Discussion of test results with the performing providers: no  Decide to obtain previous medical records or to obtain history from someone other than the patient: no  Obtain history from someone other than the patient: no  Review and summarize past medical records: no  Discuss the patient with other providers: no  Independent visualization of images, tracings, or specimens: no    Risk of Complications, Morbidity, and/or Mortality  Presenting problems: low  Diagnostic procedures: moderate  Management options: moderate    /   Vitals Reviewed:    Vitals:    02/25/23 2143   BP: 116/82   Pulse: 74   Resp: 18   Temp: 97.9 °F (36.6 °C) TempSrc: Oral   SpO2: 98%   Weight: 170 lb (77.1 kg)   Height: 5' 7\" (1.702 m)     The patient was seen and examined. Appropriate diagnostic testing was performed and results reviewed with the patient. The results of pertinent diagnostic studies and exam findings were discussed. The patients provisional diagnosis and plan of care were discussed with the patient and present family who expressed understanding. Any medications were reviewed and indications and risks of medications were discussed with the patient /family present. Strict verbal and written return precautions, instructions and appropriate follow-up provided to  the patient.      ED Medications administered this visit:  (None if blank)  Medications   pseudoephedrine (SUDAFED 12 HR) extended release tablet 120 mg (has no administration in time range)     PROCEDURES: (None if blank)  Procedures:     CRITICAL CARE: (None if blank)    DISCHARGE PRESCRIPTIONS: (None if blank)  New Prescriptions    PSEUDOEPHEDRINE (DECONGESTANT) 30 MG TABLET    Take 1 tablet by mouth every 6 hours as needed for Congestion     FINAL IMPRESSION      1. COVID-19        DISPOSITION/PLAN   DISPOSITION Decision To Discharge 02/25/2023 11:11:28 PM    OUTPATIENT FOLLOW UP THE PATIENT:  [facility] EMERGENCY DEPT  [address]  [address] [place] [address] Rd  [phone]    As needed, If symptoms worsen    Torie Brannon, [facility], GARIMA - CNP  02/25/23 5037

## 2023-02-27 ENCOUNTER — TELEPHONE (OUTPATIENT)
Dept: FAMILY MEDICINE CLINIC | Age: 43
End: 2023-02-27

## 2023-02-27 LAB — BACTERIA THROAT AEROBE CULT: NORMAL

## 2023-02-27 NOTE — LETTER
María Sharp [address] [facility]  [address], [address]  Phone: [phone]  Fax: [phone]    February 27, 2023    [address] [place] [address]      Dear Julian Cruz,    This letter is regarding your Emergency Department (ED) visit at [address] [address] on 2/25/23. Dr. Rosana Dunbar wanted to make sure that you understand your discharge instructions and that you were able to fill any prescriptions that may have been ordered for you. Please contact the office at the above phone number if the ED advised you to follow up with Dr. Rosana Dunbar, or if you have any further questions or needs. Also did you know -   *Visiting the ED for a non-emergency could result in higher co-pays than you would normally be subject to paying? *You can call your doctor even after hours so they can direct you to the most appropriate care. [facility]) practices can often offer you an appointment on the same day that you call. *We have some [facility] offices that offer Walk-in appointments; check our website for availability in your community, [email or web address].      *Evisits are now available for patients for $36 through Streetline for certain conditions:  * Sinus, cold and or cough       * Diarrhea            * Headache  * Heartburn                                * Poison Cecilia          * Back pain     * Urinary problems                         If you do not have Ber[employer] and are interested, please contact the office and a staff member may assist you or go to [email or web address].     Sincerely,     Rosana Dunbar MD and your [facility]

## 2023-05-01 ENCOUNTER — TELEPHONE (OUTPATIENT)
Dept: OBGYN CLINIC | Age: 43
End: 2023-05-01

## 2023-06-22 ENCOUNTER — HOSPITAL ENCOUNTER (EMERGENCY)
Age: 43
Discharge: HOME | End: 2023-06-22

## 2023-06-22 ENCOUNTER — APPOINTMENT (OUTPATIENT)
Dept: GENERAL RADIOLOGY | Age: 43
End: 2023-06-22

## 2023-06-22 VITALS
SYSTOLIC BLOOD PRESSURE: 121 MMHG | HEART RATE: 93 BPM | RESPIRATION RATE: 18 BRPM | OXYGEN SATURATION: 99 % | DIASTOLIC BLOOD PRESSURE: 85 MMHG | TEMPERATURE: 98 F

## 2023-06-22 DIAGNOSIS — M77.8 TENDINITIS OF ELBOW: Primary | ICD-10-CM

## 2023-06-22 PROCEDURE — 96372 THER/PROPH/DIAG INJ SC/IM: CPT

## 2023-06-22 PROCEDURE — 6370000000 HC RX 637 (ALT 250 FOR IP): Performed by: NURSE PRACTITIONER

## 2023-06-22 PROCEDURE — 6360000002 HC RX W HCPCS: Performed by: NURSE PRACTITIONER

## 2023-06-22 PROCEDURE — 99284 EMERGENCY DEPT VISIT MOD MDM: CPT

## 2023-06-22 PROCEDURE — 73080 X-RAY EXAM OF ELBOW: CPT

## 2023-06-22 RX ORDER — METHYLPREDNISOLONE 4 MG/1
TABLET ORAL
Qty: 1 KIT | Refills: 0 | Status: SHIPPED | OUTPATIENT
Start: 2023-06-22 | End: 2023-06-28

## 2023-06-22 RX ORDER — KETOROLAC TROMETHAMINE 30 MG/ML
30 INJECTION, SOLUTION INTRAMUSCULAR; INTRAVENOUS ONCE
Status: COMPLETED | OUTPATIENT
Start: 2023-06-22 | End: 2023-06-22

## 2023-06-22 RX ORDER — PREDNISONE 20 MG/1
40 TABLET ORAL ONCE
Status: COMPLETED | OUTPATIENT
Start: 2023-06-22 | End: 2023-06-22

## 2023-06-22 RX ADMIN — KETOROLAC TROMETHAMINE 30 MG: 30 INJECTION, SOLUTION INTRAMUSCULAR at 12:07

## 2023-06-22 RX ADMIN — PREDNISONE 40 MG: 20 TABLET ORAL at 12:07

## 2023-06-22 ASSESSMENT — PAIN - FUNCTIONAL ASSESSMENT: PAIN_FUNCTIONAL_ASSESSMENT: 0-10

## 2023-06-22 ASSESSMENT — PAIN SCALES - GENERAL: PAINLEVEL_OUTOF10: 4

## 2023-06-22 ASSESSMENT — PAIN DESCRIPTION - ORIENTATION: ORIENTATION: RIGHT

## 2023-06-22 ASSESSMENT — PAIN DESCRIPTION - LOCATION: LOCATION: ELBOW

## 2023-06-22 NOTE — DISCHARGE INSTRUCTIONS
Rest, ice, elevate frequently. Over-the-counter Tylenol and/or Motrin as needed for pain. Medrol Dosepak as prescribed. Over-the-counter elbow brace as needed for support. Follow-up with primary care provider within the next week for reevaluation. If any worsening or concerns return to the ER immediately.

## 2023-06-22 NOTE — ED TRIAGE NOTES
Pt presents to the ED for right elbow pain x3 months. Pt states he recently went to [facility] and since then the pain has increased. Pt denies taking medication for the pain today.

## 2023-06-22 NOTE — ED PROVIDER NOTES
[facility] [address] [address] EMERGENCY DEPT      EMERGENCY MEDICINE     Pt Name: Killian Gonsalez  MRN: [id number]  [address] 1980  Date of evaluation: 6/22/2023  Provider: GARIMA Burger CNP    CHIEF COMPLAINT       Chief Complaint   Patient presents with    Elbow Pain     right     HISTORY OF [address] is a pleasant 37 y.o. male who presents to the emergency department from from home, by private vehicle for evaluation of right elbow pain that has been worsening over the last 3 months. Patient states he was at [employer] today going through his normal procedure when his right elbow pain was so severe that it brought him to tears. He describes the pain as a burning, shooting pain that goes down his right forearm. The pain is mostly localized to the lateral right elbow. Rest his pain is minimal but the pain is much worse when he tries grabbing or pulling something. Patient denies any known trauma to the elbow. He has been taking ibuprofen and icing his right elbow with minimal relief. Patient states he works with his hands a lot and performs the same repetitive motions at work. He denies any weakness or loss of sensation. He has no back pain or upper arm pain. Patient has no other medical complaints today. PASTMEDICAL HISTORY     Past Medical History:   Diagnosis Date    COVID-19 02/25/2023    Psychiatric problem        Patient Active Problem List   Diagnosis Code    [facility] F31.81     SURGICAL HISTORY       Past Surgical History:   Procedure Laterality Date    TYMPANOSTOMY TUBE PLACEMENT Bilateral     as a kid       CURRENT MEDICATIONS       Discharge Medication List as of 6/22/2023  1:02 PM        CONTINUE these medications which have NOT CHANGED    Details   Varenicline Tartrate, Starter, 0.5 MG X 11 & 1 MG X 42 TBPK Take 1 Package by mouth daily, Disp-1 each, R-0Normal      !!  Elastic Bandages & Supports (WRIST SPLINT/COCK-UP/LEFT L) MISC Nightly Starting Fri 2/3/2023,

## 2023-06-23 ENCOUNTER — TELEPHONE (OUTPATIENT)
Dept: FAMILY MEDICINE CLINIC | Age: 43
End: 2023-06-23

## 2023-11-21 ENCOUNTER — APPOINTMENT (OUTPATIENT)
Dept: CT IMAGING | Age: 43
End: 2023-11-21

## 2023-11-21 ENCOUNTER — HOSPITAL ENCOUNTER (EMERGENCY)
Age: 43
Discharge: HOME | End: 2023-11-21
Attending: EMERGENCY MEDICINE

## 2023-11-21 VITALS
RESPIRATION RATE: 16 BRPM | BODY MASS INDEX: 26.16 KG/M2 | DIASTOLIC BLOOD PRESSURE: 62 MMHG | SYSTOLIC BLOOD PRESSURE: 109 MMHG | OXYGEN SATURATION: 99 % | HEART RATE: 82 BPM | WEIGHT: 167 LBS | TEMPERATURE: 98.5 F

## 2023-11-21 DIAGNOSIS — R10.31 ABDOMINAL PAIN, RIGHT LOWER QUADRANT: Primary | ICD-10-CM

## 2023-11-21 DIAGNOSIS — R11.2 NAUSEA AND VOMITING, UNSPECIFIED VOMITING TYPE: ICD-10-CM

## 2023-11-21 LAB
ALBUMIN SERPL BCG-MCNC: 3.9 G/DL (ref 3.5–5.1)
ALP SERPL-CCNC: 102 U/L (ref 38–126)
ALT SERPL W/O P-5'-P-CCNC: 13 U/L (ref 11–66)
ANION GAP SERPL CALC-SCNC: 7 MEQ/L (ref 8–16)
AST SERPL-CCNC: 23 U/L (ref 5–40)
BACTERIA URNS QL MICRO: ABNORMAL /HPF
BASOPHILS ABSOLUTE: 0 THOU/MM3 (ref 0–0.1)
BASOPHILS NFR BLD AUTO: 0.2 %
BILIRUB SERPL-MCNC: 0.3 MG/DL (ref 0.3–1.2)
BILIRUB UR QL STRIP.AUTO: NEGATIVE
BUN SERPL-MCNC: 13 MG/DL (ref 7–22)
CALCIUM SERPL-MCNC: 8.6 MG/DL (ref 8.5–10.5)
CASTS #/AREA URNS LPF: ABNORMAL /LPF
CASTS 2: ABNORMAL /LPF
CHARACTER UR: CLEAR
CHLORIDE SERPL-SCNC: 109 MEQ/L (ref 98–111)
CO2 SERPL-SCNC: 23 MEQ/L (ref 23–33)
COLOR: YELLOW
CREAT SERPL-MCNC: 0.7 MG/DL (ref 0.4–1.2)
CRP SERPL-MCNC: 0.9 MG/DL (ref 0–1)
CRYSTALS URNS MICRO: ABNORMAL
DEPRECATED RDW RBC AUTO: 44.6 FL (ref 35–45)
EOSINOPHIL NFR BLD AUTO: 1.3 %
EOSINOPHILS ABSOLUTE: 0.2 THOU/MM3 (ref 0–0.4)
EPITHELIAL CELLS, UA: ABNORMAL /HPF
ERYTHROCYTE [DISTWIDTH] IN BLOOD BY AUTOMATED COUNT: 13.2 % (ref 11.5–14.5)
FLUAV RNA RESP QL NAA+PROBE: NOT DETECTED
FLUBV RNA RESP QL NAA+PROBE: NOT DETECTED
GFR SERPL CREATININE-BSD FRML MDRD: > 60 ML/MIN/1.73M2
GLUCOSE SERPL-MCNC: 107 MG/DL (ref 70–108)
GLUCOSE UR QL STRIP.AUTO: NEGATIVE MG/DL
HCT VFR BLD AUTO: 50 % (ref 42–52)
HGB BLD-MCNC: 16.5 GM/DL (ref 14–18)
HGB UR QL STRIP.AUTO: NEGATIVE
IMM GRANULOCYTES # BLD AUTO: 0.05 THOU/MM3 (ref 0–0.07)
IMM GRANULOCYTES NFR BLD AUTO: 0.3 %
KETONES UR QL STRIP.AUTO: ABNORMAL
LIPASE SERPL-CCNC: 22.7 U/L (ref 5.6–51.3)
LYMPHOCYTES ABSOLUTE: 0.6 THOU/MM3 (ref 1–4.8)
LYMPHOCYTES NFR BLD AUTO: 3.6 %
MCH RBC QN AUTO: 30.1 PG (ref 26–33)
MCHC RBC AUTO-ENTMCNC: 33 GM/DL (ref 32.2–35.5)
MCV RBC AUTO: 91.2 FL (ref 80–94)
MISCELLANEOUS 2: ABNORMAL
MONOCYTES ABSOLUTE: 0.9 THOU/MM3 (ref 0.4–1.3)
MONOCYTES NFR BLD AUTO: 5.9 %
NEUTROPHILS NFR BLD AUTO: 88.7 %
NITRITE UR QL STRIP: NEGATIVE
NRBC BLD AUTO-RTO: 0 /100 WBC
OSMOLALITY SERPL CALC.SUM OF ELEC: 278.1 MOSMOL/KG (ref 275–300)
PH UR STRIP.AUTO: 6 [PH] (ref 5–9)
PLATELET # BLD AUTO: 247 THOU/MM3 (ref 130–400)
PMV BLD AUTO: 11.1 FL (ref 9.4–12.4)
POTASSIUM SERPL-SCNC: 4.1 MEQ/L (ref 3.5–5.2)
PROT SERPL-MCNC: 6.4 G/DL (ref 6.1–8)
PROT UR STRIP.AUTO-MCNC: ABNORMAL MG/DL
RBC # BLD AUTO: 5.48 MILL/MM3 (ref 4.7–6.1)
RBC URINE: ABNORMAL /HPF
RENAL EPI CELLS #/AREA URNS HPF: ABNORMAL /[HPF]
SARS-COV-2 RNA RESP QL NAA+PROBE: NOT DETECTED
SEGMENTED NEUTROPHILS ABSOLUTE COUNT: 13.8 THOU/MM3 (ref 1.8–7.7)
SODIUM SERPL-SCNC: 139 MEQ/L (ref 135–145)
SP GR UR REFRACT.AUTO: 1.03 (ref 1–1.03)
UROBILINOGEN, URINE: 1 EU/DL (ref 0–1)
WBC # BLD AUTO: 15.6 THOU/MM3 (ref 4.8–10.8)
WBC #/AREA URNS HPF: ABNORMAL /HPF
WBC #/AREA URNS HPF: NEGATIVE /[HPF]
YEAST LIKE FUNGI URNS QL MICRO: ABNORMAL

## 2023-11-21 PROCEDURE — 36415 COLL VENOUS BLD VENIPUNCTURE: CPT

## 2023-11-21 PROCEDURE — 80053 COMPREHEN METABOLIC PANEL: CPT

## 2023-11-21 PROCEDURE — 85025 COMPLETE CBC W/AUTO DIFF WBC: CPT

## 2023-11-21 PROCEDURE — 2580000003 HC RX 258: Performed by: STUDENT IN AN ORGANIZED HEALTH CARE EDUCATION/TRAINING PROGRAM

## 2023-11-21 PROCEDURE — 6370000000 HC RX 637 (ALT 250 FOR IP): Performed by: STUDENT IN AN ORGANIZED HEALTH CARE EDUCATION/TRAINING PROGRAM

## 2023-11-21 PROCEDURE — 6360000004 HC RX CONTRAST MEDICATION: Performed by: STUDENT IN AN ORGANIZED HEALTH CARE EDUCATION/TRAINING PROGRAM

## 2023-11-21 PROCEDURE — 86140 C-REACTIVE PROTEIN: CPT

## 2023-11-21 PROCEDURE — 96374 THER/PROPH/DIAG INJ IV PUSH: CPT

## 2023-11-21 PROCEDURE — 87636 SARSCOV2 & INF A&B AMP PRB: CPT

## 2023-11-21 PROCEDURE — 83690 ASSAY OF LIPASE: CPT

## 2023-11-21 PROCEDURE — 99285 EMERGENCY DEPT VISIT HI MDM: CPT

## 2023-11-21 PROCEDURE — 81001 URINALYSIS AUTO W/SCOPE: CPT

## 2023-11-21 PROCEDURE — 96375 TX/PRO/DX INJ NEW DRUG ADDON: CPT

## 2023-11-21 PROCEDURE — 74177 CT ABD & PELVIS W/CONTRAST: CPT

## 2023-11-21 PROCEDURE — 6360000002 HC RX W HCPCS: Performed by: STUDENT IN AN ORGANIZED HEALTH CARE EDUCATION/TRAINING PROGRAM

## 2023-11-21 RX ORDER — ONDANSETRON 2 MG/ML
4 INJECTION INTRAMUSCULAR; INTRAVENOUS ONCE
Status: COMPLETED | OUTPATIENT
Start: 2023-11-21 | End: 2023-11-21

## 2023-11-21 RX ORDER — IBUPROFEN 200 MG
400 TABLET ORAL ONCE
Status: COMPLETED | OUTPATIENT
Start: 2023-11-21 | End: 2023-11-21

## 2023-11-21 RX ORDER — ONDANSETRON 4 MG/1
4 TABLET, ORALLY DISINTEGRATING ORAL 3 TIMES DAILY PRN
Qty: 9 TABLET | Refills: 0 | Status: SHIPPED | OUTPATIENT
Start: 2023-11-21

## 2023-11-21 RX ORDER — MORPHINE SULFATE 4 MG/ML
4 INJECTION, SOLUTION INTRAMUSCULAR; INTRAVENOUS ONCE
Status: COMPLETED | OUTPATIENT
Start: 2023-11-21 | End: 2023-11-21

## 2023-11-21 RX ORDER — ACETAMINOPHEN 500 MG
1000 TABLET ORAL ONCE
Status: COMPLETED | OUTPATIENT
Start: 2023-11-21 | End: 2023-11-21

## 2023-11-21 RX ORDER — 0.9 % SODIUM CHLORIDE 0.9 %
1000 INTRAVENOUS SOLUTION INTRAVENOUS ONCE
Status: COMPLETED | OUTPATIENT
Start: 2023-11-21 | End: 2023-11-21

## 2023-11-21 RX ADMIN — IOPAMIDOL 80 ML: 755 INJECTION, SOLUTION INTRAVENOUS at 14:54

## 2023-11-21 RX ADMIN — IBUPROFEN 400 MG: 200 TABLET, FILM COATED ORAL at 16:51

## 2023-11-21 RX ADMIN — SODIUM CHLORIDE 1000 ML: 9 INJECTION, SOLUTION INTRAVENOUS at 14:21

## 2023-11-21 RX ADMIN — ONDANSETRON 4 MG: 2 INJECTION INTRAMUSCULAR; INTRAVENOUS at 14:22

## 2023-11-21 RX ADMIN — ACETAMINOPHEN 1000 MG: 500 TABLET ORAL at 16:51

## 2023-11-21 RX ADMIN — MORPHINE SULFATE 4 MG: 4 INJECTION, SOLUTION INTRAMUSCULAR; INTRAVENOUS at 14:22

## 2023-11-21 ASSESSMENT — PAIN DESCRIPTION - ORIENTATION: ORIENTATION: RIGHT;LOWER

## 2023-11-21 ASSESSMENT — PAIN SCALES - GENERAL
PAINLEVEL_OUTOF10: 6
PAINLEVEL_OUTOF10: 4
PAINLEVEL_OUTOF10: 6
PAINLEVEL_OUTOF10: 0
PAINLEVEL_OUTOF10: 6

## 2023-11-21 ASSESSMENT — PAIN DESCRIPTION - DESCRIPTORS: DESCRIPTORS: SHARP

## 2023-11-21 ASSESSMENT — PAIN - FUNCTIONAL ASSESSMENT
PAIN_FUNCTIONAL_ASSESSMENT: 0-10
PAIN_FUNCTIONAL_ASSESSMENT: NONE - DENIES PAIN
PAIN_FUNCTIONAL_ASSESSMENT: 0-10

## 2023-11-21 ASSESSMENT — PAIN DESCRIPTION - PAIN TYPE
TYPE: ACUTE PAIN
TYPE: ACUTE PAIN

## 2023-11-21 ASSESSMENT — PAIN DESCRIPTION - FREQUENCY: FREQUENCY: CONTINUOUS

## 2023-11-21 ASSESSMENT — PAIN DESCRIPTION - LOCATION
LOCATION: ABDOMEN
LOCATION: ABDOMEN

## 2023-11-21 NOTE — DISCHARGE INSTRUCTIONS
Seen today for abdominal pain, your lab work is reassuring, your CT scan does not show any acute findings requiring further intervention or admission at this time. Please follow-up with your primary care physician Dr. James Parmar as scheduled next month or you can call them and see if you can and see them earlier. Take the medicine ibuprofen as needed for pain. Take at discharge as needed for nausea. If the area on your abdomen, back, does not go back into becomes red inflamed or other new concerns please contact emergency room ED for reevaluation.

## 2023-11-21 NOTE — ED NOTES
Pt presents ambulatory to ED via triage for c/o RLQ pain x2 weeks and emesis. Pt states the RLQ pain has been intermittent for the last year, with it progressing over the last 2 weeks. Pt reports waking this morning and having emesis x2 since waking. Upon initial assessment, pt is A&Ox4, resps easy and unlabored. IV established with blood drawn and sent to lab. Urine specimen collected and sent to lab. Dr Yohana Jeong at bedside for assessment. Awaiting further orders. Will monitor.      Jonah Saldana RN  11/21/23 8589

## 2023-11-21 NOTE — ED NOTES
In for hourly rounding. Pt resting on cot in position of comfort. Pt remains A&Ox4, resps easy and unlabored. IV infusing and shows no s/s of infection or infiltration. Pt pain remains unchanged at this time. Medicated pt per MAR. Monitor remains in place. Updated pt on POC. Will monitor.        Shaunna Mcfarland RN  11/21/23 0999

## 2023-11-21 NOTE — ED NOTES
In for hourly rounding when pt returns from CT scan. Pt resting on cot in position of comfort. Pt remains A&Ox4, resps easy and unlabored. IV shows no s/s of infection or infiltration. Pt states, \"I'm so high right now, I don't feel anything\" when inquiring about pain status. Monitor remains in place. Updated pt on POC. Will monitor.      Eric Barthel, RN  11/21/23 2550

## 2023-11-21 NOTE — ED PROVIDER NOTES
Nausea or Vomiting, Disp-9 tablet, R-0Normal             FINAL IMPRESSION     Final diagnoses:   Abdominal pain, right lower quadrant   Nausea and vomiting, unspecified vomiting type       DISPOSITION   DISPOSITION Decision To Discharge 11/21/2023 04:54:58 PM      Results and plan discussed with patient at bedside. Patient is agreeable to plan. Outpatient Follow-Up:  Ok Keene MD  [address] [facility]    Go on 12/15/2023      [facility] EMERGENCY DEPT  [address]  Go to   If symptoms worsen    Roe Lee MD  [facility] [address]  [phone]    Schedule an appointment as soon as possible for a visit           This transcription was electronically signed. Parts of this transcriptions may have been dictated by use of voice recognition software and electronically transcribed. The transcription may contain errors not detected in proofreading. Please refer to my supervising physician's documentation if my documentation differs.     Electronically Signed: Susan Avery MD, 11/21/23, 6:18 PM         Lissa Espinosa MD  Resident  11/21/23 2303

## 2023-12-15 ENCOUNTER — APPOINTMENT (OUTPATIENT)
Dept: ULTRASOUND IMAGING | Age: 43
End: 2023-12-15

## 2023-12-15 ENCOUNTER — HOSPITAL ENCOUNTER (EMERGENCY)
Age: 43
Discharge: HOME | End: 2023-12-15

## 2023-12-15 ENCOUNTER — APPOINTMENT (OUTPATIENT)
Dept: CT IMAGING | Age: 43
End: 2023-12-15

## 2023-12-15 VITALS
HEIGHT: 67 IN | RESPIRATION RATE: 17 BRPM | OXYGEN SATURATION: 95 % | BODY MASS INDEX: 25.11 KG/M2 | WEIGHT: 160 LBS | HEART RATE: 72 BPM | SYSTOLIC BLOOD PRESSURE: 107 MMHG | TEMPERATURE: 97.8 F | DIASTOLIC BLOOD PRESSURE: 61 MMHG

## 2023-12-15 DIAGNOSIS — K40.90 RIGHT INGUINAL HERNIA: Primary | ICD-10-CM

## 2023-12-15 PROCEDURE — 74177 CT ABD & PELVIS W/CONTRAST: CPT

## 2023-12-15 PROCEDURE — 6360000004 HC RX CONTRAST MEDICATION: Performed by: NURSE PRACTITIONER

## 2023-12-15 PROCEDURE — 76882 US LMTD JT/FCL EVL NVASC XTR: CPT

## 2023-12-15 RX ADMIN — IOPAMIDOL 80 ML: 755 INJECTION, SOLUTION INTRAVENOUS at 04:47

## 2023-12-15 ASSESSMENT — PAIN DESCRIPTION - PAIN TYPE: TYPE: ACUTE PAIN

## 2023-12-15 ASSESSMENT — PAIN SCALES - GENERAL: PAINLEVEL_OUTOF10: 8

## 2023-12-15 ASSESSMENT — PAIN DESCRIPTION - LOCATION: LOCATION: ABDOMEN

## 2023-12-15 ASSESSMENT — PAIN - FUNCTIONAL ASSESSMENT: PAIN_FUNCTIONAL_ASSESSMENT: 0-10

## 2023-12-15 ASSESSMENT — PAIN DESCRIPTION - ORIENTATION: ORIENTATION: LOWER;RIGHT

## 2023-12-15 NOTE — ED TRIAGE NOTES
Pt to ED from work with c/o right lower abdominal pain. Pt states he was lifting something heavy at work and felt a lot of pain and now has a \"ball\" in his lower abdomen. Pt states that he was seen here recently and might have a possible hernia in that area. Rates pain an 8/10 at this time. VSS.

## 2023-12-15 NOTE — DISCHARGE INSTRUCTIONS
If you note the hernia is out or is painful, lay flat and apply gentle pressure to the area. You can also apply ice to the area. If it continues to be painful (very) and you cannot get it to go back in, then you should be seen. Call the surgeon for follow up.

## 2023-12-19 NOTE — ED PROVIDER NOTES
to follow-up with family care provider in 2-3 days if no improvement. Patient is to go to the emergency department if symptoms worsen. Patient/patient representative isaware of care plan, questions answered, verbalizes understanding and is in agreement. ED Medications administered this visit:  (None if blank)  Medications   iopamidol (ISOVUE-370) 76 % injection 80 mL (80 mLs IntraVENous Given 12/15/23 0447)         CONSULTS:  None    PROCEDURES: (None if blank)  Procedures:     CRITICAL CARE: (None if blank)      DISCHARGE PRESCRIPTIONS: (None if blank)  Discharge Medication List as of 12/15/2023  5:42 AM          FINAL IMPRESSION      1. Right inguinal hernia          DISPOSITION/PLAN   DISPOSITION Decision To Discharge 12/15/2023 05:40:55 AM      OUTPATIENT FOLLOW UP THE PATIENT:  Des Ayon MD  [address] [place] [address]  [phone]    Schedule an appointment as soon as possible for a visit in 2 days  For follow up    Lamont Varghese, [address] [place] [address] [facility]  [phone]    Call in 1 day  For follow up      GARIMA Ingram - CNP        GARIMA Ingram - [facility]  12/18/23 3554

## 2023-12-27 ENCOUNTER — OFFICE VISIT (OUTPATIENT)
Dept: FAMILY MEDICINE CLINIC | Age: 43
End: 2023-12-27
Payer: COMMERCIAL

## 2023-12-27 VITALS
WEIGHT: 160.6 LBS | SYSTOLIC BLOOD PRESSURE: 92 MMHG | RESPIRATION RATE: 12 BRPM | DIASTOLIC BLOOD PRESSURE: 66 MMHG | TEMPERATURE: 98.1 F | BODY MASS INDEX: 25.21 KG/M2 | OXYGEN SATURATION: 99 % | HEART RATE: 95 BPM | HEIGHT: 67 IN

## 2023-12-27 DIAGNOSIS — G56.03 CARPAL TUNNEL SYNDROME ON BOTH SIDES: ICD-10-CM

## 2023-12-27 DIAGNOSIS — K40.90 RIGHT INGUINAL HERNIA: Primary | ICD-10-CM

## 2023-12-27 PROCEDURE — 99214 OFFICE O/P EST MOD 30 MIN: CPT

## 2024-01-02 PROBLEM — K40.90 RIGHT INGUINAL HERNIA: Status: ACTIVE | Noted: 2024-01-02

## 2024-01-03 ENCOUNTER — OFFICE VISIT (OUTPATIENT)
Dept: SURGERY | Age: 44
End: 2024-01-03

## 2024-01-03 ENCOUNTER — TELEPHONE (OUTPATIENT)
Dept: SURGERY | Age: 44
End: 2024-01-03

## 2024-01-03 VITALS
DIASTOLIC BLOOD PRESSURE: 74 MMHG | HEART RATE: 77 BPM | HEIGHT: 67 IN | SYSTOLIC BLOOD PRESSURE: 116 MMHG | WEIGHT: 159 LBS | TEMPERATURE: 97.6 F | OXYGEN SATURATION: 98 % | BODY MASS INDEX: 24.96 KG/M2 | RESPIRATION RATE: 18 BRPM

## 2024-01-03 DIAGNOSIS — K40.90 RIGHT INGUINAL HERNIA: Primary | ICD-10-CM

## 2024-01-03 DIAGNOSIS — F17.200 SMOKING: ICD-10-CM

## 2024-01-03 ASSESSMENT — ENCOUNTER SYMPTOMS
VOMITING: 0
RHINORRHEA: 0
SINUS PAIN: 0
ABDOMINAL PAIN: 0
ANAL BLEEDING: 0
CHOKING: 0
APNEA: 0
STRIDOR: 0
CHEST TIGHTNESS: 0
VOICE CHANGE: 0
NAUSEA: 0
SORE THROAT: 0
CONSTIPATION: 0
EYE ITCHING: 0
COLOR CHANGE: 0
FACIAL SWELLING: 0
WHEEZING: 0
COUGH: 0
PHOTOPHOBIA: 0
ABDOMINAL DISTENTION: 0
EYE REDNESS: 0
EYE DISCHARGE: 0
TROUBLE SWALLOWING: 0
DIARRHEA: 0
BACK PAIN: 0
SHORTNESS OF BREATH: 0
BLOOD IN STOOL: 0
EYE PAIN: 0
RECTAL PAIN: 0
SINUS PRESSURE: 0

## 2024-01-03 NOTE — TELEPHONE ENCOUNTER
Patient scheduled for surgery with Dr. Collins on 01- at 9:30am with an arrival of 7:30am.  Preop surgery instructions and antibacterial soap given.  Surgery consent signed.

## 2024-01-03 NOTE — TELEPHONE ENCOUNTER
Robotic Surgery Scheduling Form   [facility] [address] [place], [place] [address]    Phone * [phone]   Surgical Scheduling Direct line Phone * [phone]  Fax * [phone]      Neville L Ethan      1980    male    [address] [place] [address]   Marital Status:         Home Phone: [phone]   Cell Phone:   Telephone Information:   Mobile [phone]              Surgeon: Dr. Collins  Surgery Date:01-   Time: 9:30am     Procedure: Robotic assisted right inguinal hernia with mesh possible open, possible left inguinal hernia repair with mesh   Outpatient   Diagnosis: Right inguinal hernia    Important Medical History: In Epic    Special Inst/Equip:     CPT Codes: 29913    Latex Allergy:   no Cardiac Device:  no    Anesthesia Type: General    Case Location:  Main OR     Preadmission Testing: Phone Call      PAT Date and Time: ________________________________    PAT Confirmation #: _________________________________    Post Op Visit:  ______________________________________    Need Preop Cardiac Clearance:   no    Does patient have Cardiologist/physician?   No    Surgery Conformation #:  ______________________________________________    : __________________________________ Date:____________________      RNFA (colon resection only):      Insurance Company Name:  [employer]

## 2024-01-03 NOTE — PROGRESS NOTES
[facility]      Geoff Collins MD [facility]  General Surgery  New Patient Evaluation in Office  Pt Name: Neville Long  Date of Birth 1980   Today's Date: 1/3/2024  Medical Record Number: [id number]  Referring Provider: No ref. provider found  Primary Care Provider: Devante Martell MD  Chief Complaint   Patient presents with    Surgical Consult     NP seen in ER 12/15/23 - Right inguinal hernia     ASSESSMENT      Problem List Items Addressed This Visit       Right inguinal hernia - Primary    Relevant Orders    LAPAROSCOPY REPAIR HERNIA INGUINAL    Smoking     There are no active hospital problems to display for this patient.       PLANS      Schedule Neville for robotic assisted right inguinal hernia repair with mesh possible left if found at time of surgery Dr. Collins  OK for Rogers  In the office, I had a discussion with the patient regarding the risks of hernia surgery to include bleeding, infection, recurrence, injury to surrounding structures and continued pain in the area. We also discussed the use of mesh and its advantages and disadvantages.  We discussed the anesthetic options, conduct of the operation, outpatient status, post op recovery and length of time off of work. After this discussion, the patient's questions were answered and has elected to proceed with surgical repair. (OPEN)  In the office, I had a discussion with the patient regarding the risks of hernia surgery to include bleeding, infection, recurrence, injury to surrounding structures, continued pain in the area and possible conversion to an open procedure . We also discussed the use of mesh and its advantages and disadvantages.  We discussed the anesthetic options, conduct of the operation, outpatient status, post op recovery and length of time off of work. After this discussion, the patient's questions were answered and has elected to proceed with surgical repair. (L/S)  Status: outpatient  Planned anesthesia: 
self-injury, sleep disturbance and suicidal ideas. The patient is not nervous/anxious and is not hyperactive.      Chief Complaint   Patient presents with    Surgical Consult     NP seen in ER 12/15/23 - Right inguinal hernia       Objective:   Physical Exam  /74 (Site: Right Upper Arm, Position: Sitting, Cuff Size: Medium Adult)   Pulse 77   Temp 97.6 °F (36.4 °C) (Temporal)   Resp 18   Ht 1.702 m (5' 7\")   Wt 72.1 kg (159 lb)   SpO2 98%   BMI 24.90 kg/m²     Assessment:            Plan:              Shad Minaya RN

## 2024-01-04 ENCOUNTER — TELEPHONE (OUTPATIENT)
Dept: SURGERY | Age: 44
End: 2024-01-04

## 2024-01-04 NOTE — TELEPHONE ENCOUNTER
Per Varghese at [facility] no prior authorization required for CPT code 50729.  Call Ref# [id number]

## 2024-01-09 ENCOUNTER — ANESTHESIA EVENT (OUTPATIENT)
Dept: OPERATING ROOM | Age: 44
End: 2024-01-09
Payer: COMMERCIAL

## 2024-01-09 ENCOUNTER — HOSPITAL ENCOUNTER (OUTPATIENT)
Age: 44
Setting detail: OUTPATIENT SURGERY
Discharge: HOME | End: 2024-01-09
Attending: SURGERY | Admitting: SURGERY
Payer: COMMERCIAL

## 2024-01-09 ENCOUNTER — ANESTHESIA (OUTPATIENT)
Dept: OPERATING ROOM | Age: 44
End: 2024-01-09
Payer: COMMERCIAL

## 2024-01-09 VITALS
SYSTOLIC BLOOD PRESSURE: 111 MMHG | HEART RATE: 92 BPM | RESPIRATION RATE: 16 BRPM | HEIGHT: 68 IN | WEIGHT: 155 LBS | BODY MASS INDEX: 23.49 KG/M2 | TEMPERATURE: 97.9 F | OXYGEN SATURATION: 95 % | DIASTOLIC BLOOD PRESSURE: 70 MMHG

## 2024-01-09 DIAGNOSIS — Z87.19 S/P LAPAROSCOPIC HERNIA REPAIR: Primary | ICD-10-CM

## 2024-01-09 DIAGNOSIS — Z98.890 S/P LAPAROSCOPIC HERNIA REPAIR: Primary | ICD-10-CM

## 2024-01-09 PROCEDURE — 7100000011 HC PHASE II RECOVERY - ADDTL 15 MIN: Performed by: SURGERY

## 2024-01-09 PROCEDURE — 3700000000 HC ANESTHESIA ATTENDED CARE: Performed by: SURGERY

## 2024-01-09 PROCEDURE — 6360000002 HC RX W HCPCS: Performed by: SURGERY

## 2024-01-09 PROCEDURE — 7100000000 HC PACU RECOVERY - FIRST 15 MIN: Performed by: SURGERY

## 2024-01-09 PROCEDURE — 3600000009 HC SURGERY ROBOT BASE: Performed by: SURGERY

## 2024-01-09 PROCEDURE — 2709999900 HC NON-CHARGEABLE SUPPLY: Performed by: SURGERY

## 2024-01-09 PROCEDURE — 3700000001 HC ADD 15 MINUTES (ANESTHESIA): Performed by: SURGERY

## 2024-01-09 PROCEDURE — 3600000019 HC SURGERY ROBOT ADDTL 15MIN: Performed by: SURGERY

## 2024-01-09 PROCEDURE — 6360000002 HC RX W HCPCS

## 2024-01-09 PROCEDURE — 2580000003 HC RX 258: Performed by: SURGERY

## 2024-01-09 PROCEDURE — 6370000000 HC RX 637 (ALT 250 FOR IP): Performed by: SURGERY

## 2024-01-09 PROCEDURE — C1781 MESH (IMPLANTABLE): HCPCS | Performed by: SURGERY

## 2024-01-09 PROCEDURE — 49650 LAP ING HERNIA REPAIR INIT: CPT | Performed by: SURGERY

## 2024-01-09 PROCEDURE — S2900 ROBOTIC SURGICAL SYSTEM: HCPCS | Performed by: SURGERY

## 2024-01-09 PROCEDURE — 6370000000 HC RX 637 (ALT 250 FOR IP)

## 2024-01-09 PROCEDURE — 7100000010 HC PHASE II RECOVERY - FIRST 15 MIN: Performed by: SURGERY

## 2024-01-09 PROCEDURE — 2500000003 HC RX 250 WO HCPCS

## 2024-01-09 PROCEDURE — 6360000002 HC RX W HCPCS: Performed by: ANESTHESIOLOGY

## 2024-01-09 PROCEDURE — 7100000001 HC PACU RECOVERY - ADDTL 15 MIN: Performed by: SURGERY

## 2024-01-09 DEVICE — MESH HERN W10XL15CM POLY POLYLACTIC ACID 70% CLLGN 30% GLYC: Type: IMPLANTABLE DEVICE | Status: FUNCTIONAL

## 2024-01-09 RX ORDER — HYDROCODONE BITARTRATE AND ACETAMINOPHEN 5; 325 MG/1; MG/1
2 TABLET ORAL EVERY 4 HOURS PRN
Status: DISCONTINUED | OUTPATIENT
Start: 2024-01-09 | End: 2024-01-09 | Stop reason: HOSPADM

## 2024-01-09 RX ORDER — SODIUM CHLORIDE 9 MG/ML
INJECTION, SOLUTION INTRAVENOUS CONTINUOUS
Status: DISCONTINUED | OUTPATIENT
Start: 2024-01-09 | End: 2024-01-09 | Stop reason: HOSPADM

## 2024-01-09 RX ORDER — MORPHINE SULFATE 2 MG/ML
2 INJECTION, SOLUTION INTRAMUSCULAR; INTRAVENOUS
Status: CANCELLED | OUTPATIENT
Start: 2024-01-09

## 2024-01-09 RX ORDER — SODIUM CHLORIDE 0.9 % (FLUSH) 0.9 %
5-40 SYRINGE (ML) INJECTION EVERY 12 HOURS SCHEDULED
Status: DISCONTINUED | OUTPATIENT
Start: 2024-01-09 | End: 2024-01-09 | Stop reason: HOSPADM

## 2024-01-09 RX ORDER — DEXAMETHASONE SODIUM PHOSPHATE 10 MG/ML
INJECTION, EMULSION INTRAMUSCULAR; INTRAVENOUS PRN
Status: DISCONTINUED | OUTPATIENT
Start: 2024-01-09 | End: 2024-01-09 | Stop reason: SDUPTHER

## 2024-01-09 RX ORDER — MIDAZOLAM HYDROCHLORIDE 1 MG/ML
INJECTION INTRAMUSCULAR; INTRAVENOUS PRN
Status: DISCONTINUED | OUTPATIENT
Start: 2024-01-09 | End: 2024-01-09 | Stop reason: SDUPTHER

## 2024-01-09 RX ORDER — FENTANYL CITRATE 50 UG/ML
INJECTION, SOLUTION INTRAMUSCULAR; INTRAVENOUS
Status: COMPLETED
Start: 2024-01-09 | End: 2024-01-09

## 2024-01-09 RX ORDER — ROCURONIUM BROMIDE 10 MG/ML
INJECTION, SOLUTION INTRAVENOUS PRN
Status: DISCONTINUED | OUTPATIENT
Start: 2024-01-09 | End: 2024-01-09 | Stop reason: SDUPTHER

## 2024-01-09 RX ORDER — ONDANSETRON 2 MG/ML
INJECTION INTRAMUSCULAR; INTRAVENOUS PRN
Status: DISCONTINUED | OUTPATIENT
Start: 2024-01-09 | End: 2024-01-09 | Stop reason: SDUPTHER

## 2024-01-09 RX ORDER — FENTANYL CITRATE 50 UG/ML
INJECTION, SOLUTION INTRAMUSCULAR; INTRAVENOUS PRN
Status: DISCONTINUED | OUTPATIENT
Start: 2024-01-09 | End: 2024-01-09 | Stop reason: SDUPTHER

## 2024-01-09 RX ORDER — DEXAMETHASONE SODIUM PHOSPHATE 4 MG/ML
8 INJECTION, SOLUTION INTRA-ARTICULAR; INTRALESIONAL; INTRAMUSCULAR; INTRAVENOUS; SOFT TISSUE ONCE
Status: COMPLETED | OUTPATIENT
Start: 2024-01-09 | End: 2024-01-09

## 2024-01-09 RX ORDER — SODIUM CHLORIDE 9 MG/ML
INJECTION, SOLUTION INTRAVENOUS PRN
Status: CANCELLED | OUTPATIENT
Start: 2024-01-09

## 2024-01-09 RX ORDER — SODIUM CHLORIDE 9 MG/ML
INJECTION, SOLUTION INTRAVENOUS PRN
Status: DISCONTINUED | OUTPATIENT
Start: 2024-01-09 | End: 2024-01-09 | Stop reason: HOSPADM

## 2024-01-09 RX ORDER — HYDROCODONE BITARTRATE AND ACETAMINOPHEN 5; 325 MG/1; MG/1
1 TABLET ORAL EVERY 4 HOURS PRN
Status: DISCONTINUED | OUTPATIENT
Start: 2024-01-09 | End: 2024-01-09 | Stop reason: HOSPADM

## 2024-01-09 RX ORDER — PROPOFOL 10 MG/ML
INJECTION, EMULSION INTRAVENOUS PRN
Status: DISCONTINUED | OUTPATIENT
Start: 2024-01-09 | End: 2024-01-09 | Stop reason: SDUPTHER

## 2024-01-09 RX ORDER — IPRATROPIUM BROMIDE AND ALBUTEROL SULFATE 2.5; .5 MG/3ML; MG/3ML
SOLUTION RESPIRATORY (INHALATION)
Status: COMPLETED
Start: 2024-01-09 | End: 2024-01-09

## 2024-01-09 RX ORDER — BUPIVACAINE HYDROCHLORIDE 5 MG/ML
INJECTION, SOLUTION PERINEURAL PRN
Status: DISCONTINUED | OUTPATIENT
Start: 2024-01-09 | End: 2024-01-09 | Stop reason: ALTCHOICE

## 2024-01-09 RX ORDER — SODIUM CHLORIDE 0.9 % (FLUSH) 0.9 %
5-40 SYRINGE (ML) INJECTION PRN
Status: DISCONTINUED | OUTPATIENT
Start: 2024-01-09 | End: 2024-01-09 | Stop reason: HOSPADM

## 2024-01-09 RX ORDER — HYDROCODONE BITARTRATE AND ACETAMINOPHEN 5; 325 MG/1; MG/1
1 TABLET ORAL EVERY 6 HOURS PRN
Qty: 28 TABLET | Refills: 0 | Status: SHIPPED | OUTPATIENT
Start: 2024-01-09 | End: 2024-01-16

## 2024-01-09 RX ORDER — LABETALOL HYDROCHLORIDE 5 MG/ML
10 INJECTION INTRAVENOUS
Status: DISCONTINUED | OUTPATIENT
Start: 2024-01-09 | End: 2024-01-09 | Stop reason: HOSPADM

## 2024-01-09 RX ORDER — ACETAMINOPHEN 500 MG
1000 TABLET ORAL ONCE
Status: COMPLETED | OUTPATIENT
Start: 2024-01-09 | End: 2024-01-09

## 2024-01-09 RX ORDER — MORPHINE SULFATE 2 MG/ML
2 INJECTION, SOLUTION INTRAMUSCULAR; INTRAVENOUS EVERY 5 MIN PRN
Status: DISCONTINUED | OUTPATIENT
Start: 2024-01-09 | End: 2024-01-09 | Stop reason: HOSPADM

## 2024-01-09 RX ORDER — SODIUM CHLORIDE 0.9 % (FLUSH) 0.9 %
5-40 SYRINGE (ML) INJECTION PRN
Status: CANCELLED | OUTPATIENT
Start: 2024-01-09

## 2024-01-09 RX ORDER — FENTANYL CITRATE 50 UG/ML
50 INJECTION, SOLUTION INTRAMUSCULAR; INTRAVENOUS EVERY 5 MIN PRN
Status: COMPLETED | OUTPATIENT
Start: 2024-01-09 | End: 2024-01-09

## 2024-01-09 RX ORDER — LIDOCAINE HCL/PF 100 MG/5ML
SYRINGE (ML) INJECTION PRN
Status: DISCONTINUED | OUTPATIENT
Start: 2024-01-09 | End: 2024-01-09 | Stop reason: SDUPTHER

## 2024-01-09 RX ORDER — SODIUM CHLORIDE 0.9 % (FLUSH) 0.9 %
5-40 SYRINGE (ML) INJECTION EVERY 12 HOURS SCHEDULED
Status: CANCELLED | OUTPATIENT
Start: 2024-01-09

## 2024-01-09 RX ORDER — MORPHINE SULFATE 4 MG/ML
4 INJECTION, SOLUTION INTRAMUSCULAR; INTRAVENOUS
Status: CANCELLED | OUTPATIENT
Start: 2024-01-09

## 2024-01-09 RX ORDER — GLYCOPYRROLATE 0.2 MG/ML
INJECTION INTRAMUSCULAR; INTRAVENOUS PRN
Status: DISCONTINUED | OUTPATIENT
Start: 2024-01-09 | End: 2024-01-09 | Stop reason: SDUPTHER

## 2024-01-09 RX ORDER — IPRATROPIUM BROMIDE AND ALBUTEROL SULFATE 2.5; .5 MG/3ML; MG/3ML
1 SOLUTION RESPIRATORY (INHALATION) ONCE
Status: COMPLETED | OUTPATIENT
Start: 2024-01-09 | End: 2024-01-09

## 2024-01-09 RX ORDER — HYDROMORPHONE HYDROCHLORIDE 2 MG/ML
INJECTION, SOLUTION INTRAMUSCULAR; INTRAVENOUS; SUBCUTANEOUS PRN
Status: DISCONTINUED | OUTPATIENT
Start: 2024-01-09 | End: 2024-01-09 | Stop reason: SDUPTHER

## 2024-01-09 RX ADMIN — FENTANYL CITRATE 100 MCG: 50 INJECTION, SOLUTION INTRAMUSCULAR; INTRAVENOUS at 10:02

## 2024-01-09 RX ADMIN — MIDAZOLAM 2 MG: 1 INJECTION INTRAMUSCULAR; INTRAVENOUS at 09:57

## 2024-01-09 RX ADMIN — Medication 2000 MG: at 10:09

## 2024-01-09 RX ADMIN — FENTANYL CITRATE 50 MCG: 50 INJECTION INTRAMUSCULAR; INTRAVENOUS at 11:15

## 2024-01-09 RX ADMIN — GLYCOPYRROLATE 0.4 MG: 0.2 INJECTION INTRAMUSCULAR; INTRAVENOUS at 09:57

## 2024-01-09 RX ADMIN — ROCURONIUM BROMIDE 20 MG: 10 INJECTION INTRAVENOUS at 10:21

## 2024-01-09 RX ADMIN — FENTANYL CITRATE 50 MCG: 50 INJECTION INTRAMUSCULAR; INTRAVENOUS at 11:20

## 2024-01-09 RX ADMIN — IPRATROPIUM BROMIDE AND ALBUTEROL SULFATE 1 DOSE: .5; 3 SOLUTION RESPIRATORY (INHALATION) at 11:20

## 2024-01-09 RX ADMIN — PROPOFOL 150 MG: 10 INJECTION, EMULSION INTRAVENOUS at 10:02

## 2024-01-09 RX ADMIN — SODIUM CHLORIDE: 9 INJECTION, SOLUTION INTRAVENOUS at 09:38

## 2024-01-09 RX ADMIN — ONDANSETRON 4 MG: 2 INJECTION INTRAMUSCULAR; INTRAVENOUS at 11:05

## 2024-01-09 RX ADMIN — DEXAMETHASONE SODIUM PHOSPHATE 8 MG: 4 INJECTION, SOLUTION INTRA-ARTICULAR; INTRALESIONAL; INTRAMUSCULAR; INTRAVENOUS; SOFT TISSUE at 09:39

## 2024-01-09 RX ADMIN — HYDROCODONE BITARTRATE AND ACETAMINOPHEN 2 TABLET: 5; 325 TABLET ORAL at 11:20

## 2024-01-09 RX ADMIN — SUGAMMADEX 200 MG: 100 INJECTION, SOLUTION INTRAVENOUS at 11:05

## 2024-01-09 RX ADMIN — Medication 100 MG: at 10:02

## 2024-01-09 RX ADMIN — ROCURONIUM BROMIDE 30 MG: 10 INJECTION INTRAVENOUS at 10:02

## 2024-01-09 RX ADMIN — HYDROMORPHONE HYDROCHLORIDE 2 MG: 2 INJECTION INTRAMUSCULAR; INTRAVENOUS; SUBCUTANEOUS at 10:17

## 2024-01-09 RX ADMIN — IPRATROPIUM BROMIDE AND ALBUTEROL SULFATE 1 DOSE: 2.5; .5 SOLUTION RESPIRATORY (INHALATION) at 11:20

## 2024-01-09 RX ADMIN — ACETAMINOPHEN 1000 MG: 500 TABLET ORAL at 09:39

## 2024-01-09 RX ADMIN — DEXAMETHASONE SODIUM PHOSPHATE 10 MG: 10 INJECTION, EMULSION INTRAMUSCULAR; INTRAVENOUS at 10:02

## 2024-01-09 ASSESSMENT — PAIN DESCRIPTION - PAIN TYPE
TYPE: SURGICAL PAIN

## 2024-01-09 ASSESSMENT — PAIN DESCRIPTION - ORIENTATION
ORIENTATION: MID;UPPER
ORIENTATION: MID;UPPER

## 2024-01-09 ASSESSMENT — PAIN DESCRIPTION - LOCATION
LOCATION: ABDOMEN

## 2024-01-09 ASSESSMENT — PAIN - FUNCTIONAL ASSESSMENT
PAIN_FUNCTIONAL_ASSESSMENT: 0-10
PAIN_FUNCTIONAL_ASSESSMENT: 0-10

## 2024-01-09 ASSESSMENT — PAIN SCALES - GENERAL
PAINLEVEL_OUTOF10: 0
PAINLEVEL_OUTOF10: 4
PAINLEVEL_OUTOF10: 2
PAINLEVEL_OUTOF10: 4

## 2024-01-09 ASSESSMENT — PAIN DESCRIPTION - DESCRIPTORS
DESCRIPTORS: ACHING;DISCOMFORT
DESCRIPTORS: SHARP;STABBING
DESCRIPTORS: SORE
DESCRIPTORS: ACHING;DISCOMFORT
DESCRIPTORS: ACHING;DISCOMFORT

## 2024-01-09 ASSESSMENT — LIFESTYLE VARIABLES: SMOKING_STATUS: 1

## 2024-01-09 NOTE — PROGRESS NOTES
1113  Awake and oriented on arrival to PACU , O2 3L NC applied , HOB elevated pt c/o 8 ABD pain   1115 medicated with Fentanyl 50 mcg IV   1120 no change in pain , medicated with Fentanyl 50 mcg IV and Norco 2 tabs , pt lungs very diminished  Duoneb aerosol Tx started   1125 Tx complete , O2 off   1130 states pain a # 5 and resting on and off   1140 resting on and off   1150 pt states pain tolerable denies any nausea   1151 meets criteria for discharge , transported to [facility]

## 2024-01-09 NOTE — PROGRESS NOTES
H and p update    Patient seen evaluating same-day surgery side marked no change in history physical since seen in the office H&P as written.

## 2024-01-09 NOTE — H&P
H and P for Surgery           [facility]  History and Physical Update    Pt Name: Neville Long  MRN: [id number]  YOB: 1980  Date of evaluation: 1/9/2024    [x] I have examined the patient and reviewed the H&P/Consult and there are no changes to the patient or plans.    [] I have examined the patient and reviewed the H&P/Consult and have noted the following changes:        Geoff Collins MD  Electronically signed 1/9/2024 at 11:01 AM            
nontender, nondistended, no masses or organomegaly. Right inguinal hernia present which is reducible. Percussion: Normal without hepatosplenomegally. Tenderness: absent.  RECTAL: deferred, not clinically indicated  NEUROLOGIC: There are no focalizing motor or sensory deficits. CN II-XII are grossly intact..    EXTREMITIES: no cyanosis, no clubbing, and no edema.      .         Electronically signed by Geoff Collins MD on 1/3/2024 at 9:59 AM

## 2024-01-09 NOTE — PROGRESS NOTES
Discharge instructions given to patient and family. verbalized understanding. Patient discharged via wheelchair

## 2024-01-09 NOTE — OP NOTE
[facility]  Operative Report    PATIENT NAME: Neville Long  MEDICAL RECORD NO. [id number]  SURGEON: Geoff Collins MD MD FACS  Primary Care Physician: Devante Martell MD  Date: 1/9/2024, 11:02 AM       PROCEDURE PERFORMED:  Robotic Assisted APOLINAR RIGHT inguinal hernia repair 10cm x 15 cm progrip mesh   PREOPERATIVE DIAGNOSIS:   Active Hospital Problems    Diagnosis Date Noted    Right inguinal hernia [K40.90] 01/02/2024       POSTOPERATIVE DIAGNOSIS: Same  SURGEON:  Geoff Collins MD, FACS  ANESTHESIA:  General endotracheal anesthesia with local.  LOCAL ANESTHESIA: 0.5 % Marcaine   30 mls used  ESTIMATED BLOOD LOSS: 0 ml  SPECIMEN:  None  COMPLICATIONS:  None; patient tolerated the procedure well.  DRAINS: none  DISPOSITION: PACU   CONDITION: stable    Patient brought to the operating room, placed supine on the operating room table.  Monitoring placed by anesthesia then general endotracheal anesthesia was administered by anesthesia.  Timeout was taken, procedure confirmed.  Abdomen was clipped prepped and draped sterilely with ChloraPrep treatments allowed to pass.  At this point it was draped sterilely.    A site was chosen in the midline custodial between the umbilicus and xiphoid process in a vertical 8 mm incision was made and using a 5 mm Optiview port and a 5 mm scope the abdominal cavity was accessed under direct vision watching all layers.  At this point the abdomen is insufflated to a pressure of 14 and the area below insertion was examined and showed no evidence of injury.  Peritoneal surfaces were glistening no other intra-abdominal pathology was noted.  The patient was then placed in 15 degrees headdown.    2 8 mm robotic ports were placed at the same line as the camera port one in the right midclavicular line even with the anterior superior iliac spine on the right in the similar location on the left.  The 5 mm Optiview port was then converted to an 8 mm robotic port.  At this

## 2024-01-09 NOTE — PROGRESS NOTES
Back to [facility] from PACU Alert.No Family at bedside. Orange sherbet and pepsi given. Side rails up bed in low position. Call light in reach

## 2024-01-09 NOTE — DISCHARGE INSTRUCTIONS
do not soak in a bath for 5 days.  [] Take sitz bath for 20 minutes twice daily and after bowel movements.  [] Keep the abdominal binder in place during the day. May remove to shower and at night.  [] Remove packing from wound in 24 hours and replace with AMD dressings daily.  [] Empty SULTANA drain daily and record the amounts.      BREAST PROCEDURES    []Following a breast procedure, it is important to continue to wear supportive garments.  []Following a sentinal lymph node biopsy, you should not be alarmed if your urine has a blue color to it. This is your body eliminating the dye used for the procedure.      ABDOMINAL/LAPAROSCOPIC SURGERY    [x]You are encouraged to get up and move around as this helps with the circulation and speeds up the healing process.  [x]Breath deeply and cough from time to time. This helps to clear your lungs and helps prevent pneumonia.  [x]Supporting your incision with a pillow or your hand helps to minimize discomfort and pain.  []Laparoscopic patients may develop shoulder pain in the first 48 hours from the gas used during the procedure.      FOLLOW-UP CARE. SPECIFICALLY WATCH FOR:  Call Your Doctor If Any of the Following Occurs:    Monitor your recovery once you leave the hospital.  As soon as you have a problem, alert your doctor.  If any of the following occur, call your doctor:  Signs of infection, including fever and chills.  Redness, swelling, increasing pain, excessive bleeding, or discharge at the incision site.  Cough, shortness of breath, chest pain.  Increased abdominal pain.  Pain that you cannot control with the medicines you have been given.  Blood in the stool.  Nausea and/or vomiting that you cannot control with the medicines you were given after surgery, or which persist for more than two days after discharge from the hospital.  Bloating and gas that persist for more than a month.  Pain, burning, urgency or frequency of urination, inability to urinate or blood in the

## 2024-01-09 NOTE — PROGRESS NOTES
Patient oriented to Same Day department and admitted to Same Day Surgery room 11.   Patient verbalized approval for first name, last initial with physician name on unit whiteboard.     Plan of care reviewed with patient.   Patient room whiteboard filled out and discussed with patient and responsible adult.   Patient and responsible adult offered Same Day Welcome Packet to review.    Call light in reach.   Bed in lowest position, locked, with one bed rail up.   SCDs and warming blanket in place.  Appropriate arm bands on patient.   Bathroom offered.   All questions and concerns of patient addressed.        Meds to Beds:   Patient informed of [facility] Meds to Beds program during admission. Patient has declined use of program.

## 2024-01-09 NOTE — ANESTHESIA PRE PROCEDURE
Department of Anesthesiology  Preprocedure Note       Name:  Neville Long   Age:  43 y.o.  :  1980                                          MRN:  [id number]         Date:  2024      Surgeon: Surgeon(s):  Geoff Collins MD    Procedure: Procedure(s):  Robotic Right Inguinal Hernia with Mesh possible Open possible Left Inguinal Hernia Repair with Mesh    Medications prior to admission:   Prior to Admission medications    Not on File       Current medications:    Current Facility-Administered Medications   Medication Dose Route Frequency Provider Last Rate Last Admin   • acetaminophen (TYLENOL) tablet 1,000 mg  1,000 mg Oral Once Geoff Collins MD       • dexAMETHasone (DECADRON) injection 8 mg  8 mg IntraVENous Once Geoff Collins MD       • 0.9 % sodium chloride infusion   IntraVENous Continuous Geoff Collins MD       • sodium chloride flush 0.9 % injection 5-40 mL  5-40 mL IntraVENous 2 times per day Geoff Collins MD       • sodium chloride flush 0.9 % injection 5-40 mL  5-40 mL IntraVENous PRN Geoff Collins MD       • 0.9 % sodium chloride infusion   IntraVENous PRN Geoff Collins MD       • ceFAZolin (ANCEF) 2000 mg in 0.9% sodium chloride 50 mL IVPB  2,000 mg IntraVENous On Call to OR Geoff Clolins MD           Allergies:    Allergies   Allergen Reactions   • Asa [Aspirin] Anaphylaxis   • Penicillins Anaphylaxis   • Tramadol Nausea And Vomiting       Problem List:    Patient Active Problem List   Diagnosis Code   • Bipolar II disorder (HCC) F31.81   • Right inguinal hernia K40.90   • Smoking F17.200       Past Medical History:        Diagnosis Date   • COVID-19 2023   • Psychiatric problem        Past Surgical History:        Procedure Laterality Date   • TYMPANOSTOMY TUBE PLACEMENT Bilateral     as a kid       Social History:    Social History     Tobacco Use   • Smoking status: Every Day     Current packs/day: 0.50     Average packs/day: 0.5

## 2024-01-09 NOTE — ANESTHESIA POSTPROCEDURE EVALUATION
Department of Anesthesiology  Postprocedure Note    Patient: Neville Long  MRN: [id number]  YOB: 1980  Date of evaluation: 1/9/2024    Procedure Summary       Date: 01/09/24 Room / Location: [facility] OR [address] [place] [address] OR    Anesthesia Start: 0957 Anesthesia Stop: 1116    Procedure: Robotic Right Inguinal Hernia with Mesh (Right: Abdomen) Diagnosis:       Right inguinal hernia      (Right inguinal hernia [K40.90])    Surgeons: Geoff Collins MD Responsible Provider: Osito Haji MD    Anesthesia Type: General ASA Status: 2            Anesthesia Type: General    Thad Phase I: Thad Score: 9    Thad Phase II: Thad Score: 10    Anesthesia Post Evaluation    Patient location during evaluation: PACU  Patient participation: complete - patient participated  Level of consciousness: awake  Airway patency: patent  Nausea & Vomiting: no vomiting and no nausea  Cardiovascular status: hemodynamically stable  Respiratory status: acceptable and nasal cannula  Hydration status: stable  Pain management: adequate    No notable events documented.

## 2024-01-10 ENCOUNTER — TELEPHONE (OUTPATIENT)
Dept: SURGERY | Age: 44
End: 2024-01-10

## 2024-01-10 NOTE — TELEPHONE ENCOUNTER
Called pt for S/P Robotic Assisted APOLINAR RIGHT inguinal hernia repair 10cm x 15 cm progrip mesh --1/9/24.  Pt stated no concerns at this time. Advised if taking any pain meds to add a stool softener with it. Pt notified of f/u appt time and date.  Advised to call if any questions or concerns.

## 2024-01-19 DIAGNOSIS — K40.90 RIGHT INGUINAL HERNIA: ICD-10-CM

## 2024-01-23 NOTE — PROGRESS NOTES
[facility]    Geoff Collins MD [facility]  General Surgery  Postprocedure Evaluation in Office  Pt Name: Neville Long  Date of Birth 1980   Today's Date: 1/24/2024  Medical Record Number: [id number]  Referring Provider: No ref. provider found  Primary Care Provider: Devante Martell MD  Chief Complaint   Patient presents with    Post-Op Check     S/P Robotic Assisted APOLINAR RIGHT inguinal hernia repair 10cm x 15 cm progrip mesh 1/9/24     ASSESSMENT      Problem List Items Addressed This Visit       S/P robotic assisted laparoscopic right inguinal hernia repair with 10 x 15 cm ProGrip mesh - Primary        PLANS       Pathology reviewed with the patient who understands. All questions were answered.  New Prescriptions    No medications on file     Patient Instructions   May return to full activity without restrictions.      Follow up: Return if symptoms worsen or fail to improve.  Orders Placed This Encounter:  No orders of the defined types were placed in this encounter.        IFEOMA Lozada is seen today for post-op follow-up. He is 2 week(s) status post Right robotic assisted APOLINAR inguinal hernia repair. He is tolerating a regular diet, having regular bowel movements. Symptoms and activity have gradually improved compared to preoperative. The surgical site is clean and has no drainage. Pain is controlled without any medications.. He has compliant with postoperative instructions.  Past Medical History  Past Medical History:   Diagnosis Date    COVID-19 02/25/2023    Psychiatric problem      Past Surgical History  Past Surgical History:   Procedure Laterality Date    HERNIA REPAIR Right 1/9/2024    Robotic Right Inguinal Hernia with Mesh performed by Geoff Collins MD at [facility] OR    TYMPANOSTOMY TUBE PLACEMENT Bilateral     as a kid     Medications  No current outpatient medications on file prior to visit.     No current facility-administered medications on file prior to visit.

## 2024-01-24 ENCOUNTER — OFFICE VISIT (OUTPATIENT)
Dept: SURGERY | Age: 44
End: 2024-01-24

## 2024-01-24 VITALS
HEIGHT: 68 IN | TEMPERATURE: 97.7 F | HEART RATE: 63 BPM | OXYGEN SATURATION: 99 % | SYSTOLIC BLOOD PRESSURE: 118 MMHG | RESPIRATION RATE: 16 BRPM | DIASTOLIC BLOOD PRESSURE: 62 MMHG | BODY MASS INDEX: 23.6 KG/M2 | WEIGHT: 155.7 LBS

## 2024-01-24 DIAGNOSIS — Z98.890 S/P LAPAROSCOPIC HERNIA REPAIR: Primary | ICD-10-CM

## 2024-01-24 DIAGNOSIS — Z87.19 S/P LAPAROSCOPIC HERNIA REPAIR: Primary | ICD-10-CM

## 2024-01-24 PROCEDURE — 99024 POSTOP FOLLOW-UP VISIT: CPT | Performed by: SURGERY

## 2024-03-22 ENCOUNTER — HOSPITAL ENCOUNTER (EMERGENCY)
Age: 44
Discharge: HOME | End: 2024-03-22

## 2024-03-22 VITALS
HEART RATE: 73 BPM | DIASTOLIC BLOOD PRESSURE: 67 MMHG | OXYGEN SATURATION: 99 % | WEIGHT: 155 LBS | SYSTOLIC BLOOD PRESSURE: 117 MMHG | HEIGHT: 68 IN | RESPIRATION RATE: 18 BRPM | TEMPERATURE: 97.5 F | BODY MASS INDEX: 23.49 KG/M2

## 2024-03-22 DIAGNOSIS — H10.9 CONJUNCTIVITIS OF RIGHT EYE, UNSPECIFIED CONJUNCTIVITIS TYPE: ICD-10-CM

## 2024-03-22 DIAGNOSIS — J06.9 ACUTE UPPER RESPIRATORY INFECTION: Primary | ICD-10-CM

## 2024-03-22 LAB
FLUAV RNA RESP QL NAA+PROBE: NOT DETECTED
FLUBV RNA RESP QL NAA+PROBE: NOT DETECTED
SARS-COV-2 RNA RESP QL NAA+PROBE: NOT DETECTED

## 2024-03-22 PROCEDURE — 6360000002 HC RX W HCPCS: Performed by: PHYSICIAN ASSISTANT

## 2024-03-22 PROCEDURE — 6370000000 HC RX 637 (ALT 250 FOR IP): Performed by: PHYSICIAN ASSISTANT

## 2024-03-22 PROCEDURE — 87636 SARSCOV2 & INF A&B AMP PRB: CPT

## 2024-03-22 PROCEDURE — 99283 EMERGENCY DEPT VISIT LOW MDM: CPT

## 2024-03-22 RX ORDER — GENTAMICIN SULFATE 3 MG/ML
2 SOLUTION/ DROPS OPHTHALMIC EVERY 4 HOURS
Qty: 5 ML | Refills: 0 | Status: SHIPPED | OUTPATIENT
Start: 2024-03-22 | End: 2024-03-29

## 2024-03-22 RX ORDER — PREDNISONE 50 MG/1
50 TABLET ORAL DAILY
Qty: 3 TABLET | Refills: 0 | Status: SHIPPED | OUTPATIENT
Start: 2024-03-22 | End: 2024-03-25

## 2024-03-22 RX ORDER — DEXAMETHASONE 4 MG/1
4 TABLET ORAL ONCE
Status: COMPLETED | OUTPATIENT
Start: 2024-03-22 | End: 2024-03-22

## 2024-03-22 RX ORDER — ACETAMINOPHEN 325 MG/1
650 TABLET ORAL ONCE
Status: COMPLETED | OUTPATIENT
Start: 2024-03-22 | End: 2024-03-22

## 2024-03-22 RX ADMIN — ACETAMINOPHEN 650 MG: 325 TABLET ORAL at 12:25

## 2024-03-22 RX ADMIN — DEXAMETHASONE 4 MG: 4 TABLET ORAL at 12:25

## 2024-03-22 ASSESSMENT — PAIN DESCRIPTION - LOCATION: LOCATION: HEAD

## 2024-03-22 ASSESSMENT — PAIN DESCRIPTION - FREQUENCY: FREQUENCY: CONTINUOUS

## 2024-03-22 ASSESSMENT — PAIN - FUNCTIONAL ASSESSMENT: PAIN_FUNCTIONAL_ASSESSMENT: 0-10

## 2024-03-22 ASSESSMENT — PAIN DESCRIPTION - PAIN TYPE: TYPE: ACUTE PAIN

## 2024-03-22 ASSESSMENT — PAIN SCALES - GENERAL: PAINLEVEL_OUTOF10: 6

## 2024-03-22 NOTE — ED NOTES
Pt has c/o headache, nasal congestion, and sore throat. Pt attempted ibuprofen with no relief, symptoms began 2 days ago.

## 2024-03-22 NOTE — ED PROVIDER NOTES
[facility] EMERGENCY DEPT  EMERGENCY DEPARTMENT ENCOUNTER      Pt Name: Neville Long  MRN: [id number]  Birthdate 1980  Date of evaluation: 3/22/2024  Provider: René Jefferson PA-C    CHIEF COMPLAINT     Chief Complaint   Patient presents with    Nasal Congestion    Cough       HISTORY OF PRESENT ILLNESS    Neville Long is a 43 y.o. male who presents to the emergency department with complaints of cough nasal congestion and headache.  Headache is global in location.  Patient also complains of drainage from his right eye.  Has been ill for the past couple days.  Steadily worsening patient's life.  Taking ibuprofen for the headache which helps.  Patient denies difficulty breathing or chest pain.  Denies fevers or chills.  Denies any pain in the eye just the drainage and irritation.  Denies any double or blurred vision      Triage notes and Nursing notes were reviewed by myself.  Any discrepancies are addressed above.    PAST MEDICAL HISTORY     Past Medical History:   Diagnosis Date    COVID-19 02/25/2023    Psychiatric problem        SURGICAL HISTORY       Past Surgical History:   Procedure Laterality Date    HERNIA REPAIR Right 1/9/2024    Robotic Right Inguinal Hernia with Mesh performed by Geoff Collins MD at [facility] OR    TYMPANOSTOMY TUBE PLACEMENT Bilateral     as a kid       CURRENT MEDICATIONS       Previous Medications    No medications on file       ALLERGIES     Asa [aspirin], Penicillins, and Tramadol    FAMILY HISTORY     No family history on file.     SOCIAL HISTORY     Social History     Socioeconomic History    Marital status:    Tobacco Use    Smoking status: Every Day     Current packs/day: 1.00     Average packs/day: 1 pack/day for 23.2 years (23.2 ttl pk-yrs)     Types: Cigarettes     Start date: 2001    Smokeless tobacco: Never   Vaping Use    Vaping Use: Some days    Substances: Nicotine    Devices: Disposable   Substance and Sexual Activity    Alcohol use: Not Currently    Drug

## 2024-04-30 ENCOUNTER — OFFICE VISIT (OUTPATIENT)
Dept: FAMILY MEDICINE CLINIC | Age: 44
End: 2024-04-30
Payer: COMMERCIAL

## 2024-04-30 VITALS
HEIGHT: 68 IN | RESPIRATION RATE: 18 BRPM | TEMPERATURE: 97.8 F | WEIGHT: 154.2 LBS | SYSTOLIC BLOOD PRESSURE: 124 MMHG | HEART RATE: 74 BPM | DIASTOLIC BLOOD PRESSURE: 72 MMHG | BODY MASS INDEX: 23.37 KG/M2 | OXYGEN SATURATION: 97 %

## 2024-04-30 DIAGNOSIS — F17.200 SMOKING: ICD-10-CM

## 2024-04-30 DIAGNOSIS — G56.03 CARPAL TUNNEL SYNDROME ON BOTH SIDES: Primary | ICD-10-CM

## 2024-04-30 PROBLEM — K40.90 RIGHT INGUINAL HERNIA: Status: RESOLVED | Noted: 2024-01-02 | Resolved: 2024-04-30

## 2024-04-30 PROCEDURE — 99213 OFFICE O/P EST LOW 20 MIN: CPT | Performed by: STUDENT IN AN ORGANIZED HEALTH CARE EDUCATION/TRAINING PROGRAM

## 2024-04-30 SDOH — ECONOMIC STABILITY: FOOD INSECURITY: WITHIN THE PAST 12 MONTHS, YOU WORRIED THAT YOUR FOOD WOULD RUN OUT BEFORE YOU GOT MONEY TO BUY MORE.: NEVER TRUE

## 2024-04-30 SDOH — ECONOMIC STABILITY: INCOME INSECURITY: HOW HARD IS IT FOR YOU TO PAY FOR THE VERY BASICS LIKE FOOD, HOUSING, MEDICAL CARE, AND HEATING?: NOT HARD AT ALL

## 2024-04-30 SDOH — ECONOMIC STABILITY: FOOD INSECURITY: WITHIN THE PAST 12 MONTHS, THE FOOD YOU BOUGHT JUST DIDN'T LAST AND YOU DIDN'T HAVE MONEY TO GET MORE.: NEVER TRUE

## 2024-04-30 ASSESSMENT — PATIENT HEALTH QUESTIONNAIRE - PHQ9
SUM OF ALL RESPONSES TO PHQ QUESTIONS 1-9: 0
8. MOVING OR SPEAKING SO SLOWLY THAT OTHER PEOPLE COULD HAVE NOTICED. OR THE OPPOSITE, BEING SO FIGETY OR RESTLESS THAT YOU HAVE BEEN MOVING AROUND A LOT MORE THAN USUAL: NOT AT ALL
6. FEELING BAD ABOUT YOURSELF - OR THAT YOU ARE A FAILURE OR HAVE LET YOURSELF OR YOUR FAMILY DOWN: NOT AT ALL
SUM OF ALL RESPONSES TO PHQ QUESTIONS 1-9: 0
SUM OF ALL RESPONSES TO PHQ QUESTIONS 1-9: 0
4. FEELING TIRED OR HAVING LITTLE ENERGY: NOT AT ALL
7. TROUBLE CONCENTRATING ON THINGS, SUCH AS READING THE NEWSPAPER OR WATCHING TELEVISION: NOT AT ALL
9. THOUGHTS THAT YOU WOULD BE BETTER OFF DEAD, OR OF HURTING YOURSELF: NOT AT ALL
SUM OF ALL RESPONSES TO PHQ QUESTIONS 1-9: 0
3. TROUBLE FALLING OR STAYING ASLEEP: NOT AT ALL
1. LITTLE INTEREST OR PLEASURE IN DOING THINGS: NOT AT ALL
5. POOR APPETITE OR OVEREATING: NOT AT ALL
SUM OF ALL RESPONSES TO PHQ9 QUESTIONS 1 & 2: 0
2. FEELING DOWN, DEPRESSED OR HOPELESS: NOT AT ALL

## 2024-04-30 ASSESSMENT — ENCOUNTER SYMPTOMS
SORE THROAT: 0
ABDOMINAL PAIN: 0
EYE DISCHARGE: 0
SHORTNESS OF BREATH: 0
RHINORRHEA: 0
COUGH: 0

## 2024-04-30 NOTE — PROGRESS NOTES
S: 43 y.o. male with No chief complaint on file.      Chief complaint, [place], and all pertinent details of the case reviewed with the resident.    Please see resident's note for specific details discussed at today's visit.  Night splints working well for control of his carpal tunnel symptoms  BP Readings from Last 3 Encounters:   03/22/24 117/67   01/24/24 118/62   01/09/24 111/70     Wt Readings from Last 3 Encounters:   03/22/24 70.3 kg (155 lb)   01/24/24 70.6 kg (155 lb 11.2 oz)   01/09/24 70.3 kg (155 lb)       O: VS:  vitals were not taken for this visit.   AAO/NAD, appropriate affect for mood  A: mild, bilateral carpal tunnel, but good strength     Diagnosis Orders   1. Carpal tunnel syndrome on both sides  TSH    T4, Free    Vitamin B12 & Folate          Plan:  Pt to ok to be tested for fitness at work for lifting  TSH, T4, b12 and folate  Continue with night splints  Health Maintenance Due   Topic Date Due    Hepatitis B vaccine (1 of 3 - 3-dose series) Never done    COVID-19 Vaccine (1) Never done    Varicella vaccine (1 of 2 - 2-dose childhood series) Never done    Pneumococcal 0-64 years Vaccine (1 of 2 - PCV) Never done    HIV screen  Never done    Hepatitis C screen  Never done    DTaP/Tdap/Td vaccine (1 - Tdap) Never done    Lipids  Never done    Depression Monitoring  02/03/2024       Attending Physician Statement  I have discussed the case, including pertinent history and exam findings with the resident. I also have seen the patient and performed key portions of the examination.  I agree with the documented assessment and plan as documented by the resident.        Carissa Kowalski MD 4/30/2024 9:59 AM

## 2024-04-30 NOTE — PROGRESS NOTES
Health Maintenance Due   Topic Date Due    Hepatitis B vaccine (1 of 3 - 3-dose series) Never done    COVID-19 Vaccine (1) Never done    Varicella vaccine (1 of 2 - 2-dose childhood series) Never done    Pneumococcal 0-64 years Vaccine (1 of 2 - PCV) Never done    HIV screen  Never done    Hepatitis C screen  Never done    DTaP/Tdap/Td vaccine (1 - Tdap) Never done    Lipids  Never done    Depression Monitoring  02/03/2024

## 2024-04-30 NOTE — PROGRESS NOTES
[facility]  [address]  [facility] [address]  Dept: [phone]  Dept Fax: [phone]  Loc: [phone]  Resident Note    Assessment & Plan:    1. Carpal tunnel syndrome on both sides    2. Smoking       Orders Placed This Encounter    TSH     Standing Status:   Future     Standing Expiration Date:   4/30/2025    T4, Free     Standing Status:   Future     Standing Expiration Date:   4/30/2025    Vitamin B12 & Folate     Standing Status:   Future     Standing Expiration Date:   4/30/2025      Clearance forms signed for patient.  He may proceed with testing for his work to verify his physical ability to handle the job.  Carpal tunnel syndrome, mild and controlled right now with night time splinting.  Measure TSH and B12/folate to ensure no other common causes that may be worsening symptoms.    Advised continued smoking cessation.  Patient to continue trying right now on his own.  Previously tried Chantix and nicotine replacement therapy without significant help.    Return if symptoms worsen or fail to improve.      -----------------------------------------------------------------------------------------------------------    HPI    Neville Long is a 43 y.o. male who presents today for:    Chief Complaint   Patient presents with    Other     Clearance forms for bilateral carpal tunnel and hx of inguinal hernia repair     Patient presents today for clearance forms so that he may test whether he is physically fit to lift weights greater than 40 pounds and other aspects of job that he is looking to obtain.  Specifically, forearm asks whether he is capable of testing for physical fitness given the fact that he has bilateral carpal tunnel syndrome and a history of inguinal hernia repair done 4 months ago by Dr. Collins.    He has bilateral carpal tunnel syndrome is relatively mild and there is no loss of strength or sensation in the

## 2024-04-30 NOTE — PROGRESS NOTES
[facility] (Screen of Drug Use):    1) How many days in the past 12 months have you used drugs other than alcohol? 0 (positive if 7 or more)    2) How many days in the past 12 months have you used drugs more than you meant to? 0 (positive if 2 or more)

## 2024-07-31 ENCOUNTER — APPOINTMENT (OUTPATIENT)
Dept: GENERAL RADIOLOGY | Age: 44
End: 2024-07-31
Payer: COMMERCIAL

## 2024-07-31 ENCOUNTER — APPOINTMENT (OUTPATIENT)
Dept: CT IMAGING | Age: 44
End: 2024-07-31
Payer: COMMERCIAL

## 2024-07-31 ENCOUNTER — HOSPITAL ENCOUNTER (EMERGENCY)
Age: 44
Discharge: HOME | End: 2024-07-31
Payer: COMMERCIAL

## 2024-07-31 ENCOUNTER — HOSPITAL ENCOUNTER (OUTPATIENT)
Dept: MRI IMAGING | Age: 44
Discharge: HOME | End: 2024-07-31

## 2024-07-31 VITALS
HEIGHT: 67 IN | SYSTOLIC BLOOD PRESSURE: 106 MMHG | BODY MASS INDEX: 24.64 KG/M2 | WEIGHT: 157 LBS | TEMPERATURE: 98.4 F | DIASTOLIC BLOOD PRESSURE: 70 MMHG | RESPIRATION RATE: 14 BRPM | HEART RATE: 72 BPM | OXYGEN SATURATION: 92 %

## 2024-07-31 DIAGNOSIS — S32.010A CLOSED COMPRESSION FRACTURE OF BODY OF L1 VERTEBRA (HCC): Primary | ICD-10-CM
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
## 2024-07-31 DIAGNOSIS — S32.010A CLOSED COMPRESSION FRACTURE OF BODY OF L1 VERTEBRA (HCC): ICD-10-CM

## 2024-07-31 PROCEDURE — 72100 X-RAY EXAM L-S SPINE 2/3 VWS: CPT

## 2024-07-31 PROCEDURE — 72146 MRI CHEST SPINE W/O DYE: CPT

## 2024-07-31 PROCEDURE — 99284 EMERGENCY DEPT VISIT MOD MDM: CPT

## 2024-07-31 PROCEDURE — 96374 THER/PROPH/DIAG INJ IV PUSH: CPT

## 2024-07-31 PROCEDURE — 6370000000 HC RX 637 (ALT 250 FOR IP): Performed by: PHYSICIAN ASSISTANT

## 2024-07-31 PROCEDURE — 96375 TX/PRO/DX INJ NEW DRUG ADDON: CPT

## 2024-07-31 PROCEDURE — 71101 X-RAY EXAM UNILAT RIBS/CHEST: CPT

## 2024-07-31 PROCEDURE — 72148 MRI LUMBAR SPINE W/O DYE: CPT

## 2024-07-31 PROCEDURE — 72131 CT LUMBAR SPINE W/O DYE: CPT

## 2024-07-31 PROCEDURE — 6360000002 HC RX W HCPCS: Performed by: PHYSICIAN ASSISTANT

## 2024-07-31 PROCEDURE — 72072 X-RAY EXAM THORAC SPINE 3VWS: CPT

## 2024-07-31 RX ORDER — OXYCODONE HYDROCHLORIDE AND ACETAMINOPHEN 5; 325 MG/1; MG/1
1 TABLET ORAL ONCE
Status: COMPLETED | OUTPATIENT
Start: 2024-07-31 | End: 2024-07-31

## 2024-07-31 RX ORDER — ORPHENADRINE CITRATE 30 MG/ML
60 INJECTION INTRAMUSCULAR; INTRAVENOUS ONCE
Status: COMPLETED | OUTPATIENT
Start: 2024-07-31 | End: 2024-07-31

## 2024-07-31 RX ORDER — KETOROLAC TROMETHAMINE 10 MG/1
10 TABLET, FILM COATED ORAL EVERY 6 HOURS PRN
Qty: 15 TABLET | Refills: 0 | Status: SHIPPED | OUTPATIENT
Start: 2024-07-31

## 2024-07-31 RX ORDER — OXYCODONE HYDROCHLORIDE AND ACETAMINOPHEN 5; 325 MG/1; MG/1
1 TABLET ORAL EVERY 6 HOURS PRN
Qty: 10 TABLET | Refills: 0 | Status: SHIPPED | OUTPATIENT
Start: 2024-07-31 | End: 2024-08-03

## 2024-07-31 RX ORDER — ORPHENADRINE CITRATE 100 MG/1
100 TABLET, EXTENDED RELEASE ORAL 2 TIMES DAILY
Qty: 14 TABLET | Refills: 0 | Status: SHIPPED | OUTPATIENT
Start: 2024-07-31

## 2024-07-31 RX ORDER — KETOROLAC TROMETHAMINE 30 MG/ML
30 INJECTION, SOLUTION INTRAMUSCULAR; INTRAVENOUS ONCE
Status: COMPLETED | OUTPATIENT
Start: 2024-07-31 | End: 2024-07-31

## 2024-07-31 RX ADMIN — OXYCODONE HYDROCHLORIDE AND ACETAMINOPHEN 1 TABLET: 5; 325 TABLET ORAL at 05:37

## 2024-07-31 RX ADMIN — ORPHENADRINE CITRATE 60 MG: 60 INJECTION INTRAMUSCULAR; INTRAVENOUS at 02:14

## 2024-07-31 RX ADMIN — KETOROLAC TROMETHAMINE 30 MG: 30 INJECTION, SOLUTION INTRAMUSCULAR at 02:14

## 2024-07-31 ASSESSMENT — ENCOUNTER SYMPTOMS
ABDOMINAL PAIN: 0
SHORTNESS OF BREATH: 0
NAUSEA: 0
COUGH: 0
BACK PAIN: 1
FACIAL SWELLING: 0
VOMITING: 0

## 2024-07-31 ASSESSMENT — PAIN SCALES - GENERAL
PAINLEVEL_OUTOF10: 8
PAINLEVEL_OUTOF10: 7
PAINLEVEL_OUTOF10: 4

## 2024-07-31 ASSESSMENT — PAIN - FUNCTIONAL ASSESSMENT
PAIN_FUNCTIONAL_ASSESSMENT: 0-10
PAIN_FUNCTIONAL_ASSESSMENT: NONE - DENIES PAIN
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
## 2024-07-31 NOTE — ED NOTES
Pt medicated per MAR at this time. Pt updated on POC, verbalized understanding. Call light in reach.

## 2024-08-01 ENCOUNTER — OFFICE VISIT (OUTPATIENT)
Dept: NEUROSURGERY | Age: 44
End: 2024-08-01

## 2024-08-01 VITALS
WEIGHT: 157 LBS | BODY MASS INDEX: 24.64 KG/M2 | SYSTOLIC BLOOD PRESSURE: 114 MMHG | HEIGHT: 67 IN | DIASTOLIC BLOOD PRESSURE: 72 MMHG | HEART RATE: 67 BPM

## 2024-08-01 DIAGNOSIS — S32.010A CLOSED COMPRESSION FRACTURE OF BODY OF L1 VERTEBRA (HCC): ICD-10-CM

## 2024-08-01 DIAGNOSIS — M51.34 BULGING OF THORACIC INTERVERTEBRAL DISC: ICD-10-CM

## 2024-08-01 DIAGNOSIS — S32.010A CLOSED COMPRESSION FRACTURE OF BODY OF L1 VERTEBRA (HCC): Primary | ICD-10-CM

## 2024-08-01 DIAGNOSIS — M51.34 DDD (DEGENERATIVE DISC DISEASE), THORACIC: ICD-10-CM

## 2024-08-01 PROCEDURE — 99203 OFFICE O/P NEW LOW 30 MIN: CPT

## 2024-08-01 RX ORDER — OXYCODONE HYDROCHLORIDE AND ACETAMINOPHEN 5; 325 MG/1; MG/1
1 TABLET ORAL EVERY 6 HOURS PRN
Qty: 10 TABLET | Refills: 0 | OUTPATIENT
Start: 2024-08-01 | End: 2024-08-04

## 2024-08-01 ASSESSMENT — ENCOUNTER SYMPTOMS
SHORTNESS OF BREATH: 0
BACK PAIN: 1
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
## 2024-08-01 NOTE — TELEPHONE ENCOUNTER
Detail Level: Detailed Patient informed, verbally understood.   Depth Of Biopsy: dermis Was A Bandage Applied: Yes Size Of Lesion In Cm: 0.3 X Size Of Lesion In Cm: 0 Biopsy Type: H and E Biopsy Method: Dermablade Anesthesia Type: 1% lidocaine with epinephrine Anesthesia Volume In Cc (Will Not Render If 0): 0.5 Hemostasis: Drysol Wound Care: Petrolatum Dressing: bandage Destruction After The Procedure: No Type Of Destruction Used: Curettage Curettage Text: The wound bed was treated with curettage after the biopsy was performed. Cryotherapy Text: The wound bed was treated with cryotherapy after the biopsy was performed. Electrodesiccation Text: The wound bed was treated with electrodesiccation after the biopsy was performed. Electrodesiccation And Curettage Text: The wound bed was treated with electrodesiccation and curettage after the biopsy was performed. Silver Nitrate Text: The wound bed was treated with silver nitrate after the biopsy was performed. Lab: 6 Lab Facility: 3 Consent: Written consent was obtained and risks were reviewed including but not limited to scarring, infection, bleeding, scabbing, incomplete removal, nerve damage and allergy to anesthesia. Post-Care Instructions: I reviewed with the patient in detail post-care instructions. Patient is to keep the biopsy site dry overnight, and then apply bacitracin twice daily until healed. Patient may apply hydrogen peroxide soaks to remove any crusting. Notification Instructions: Patient will be notified of biopsy results. However, patient instructed to call the office if not contacted within 2 weeks. Billing Type: Third-Party Bill Information: Selecting Yes will display possible errors in your note based on the variables you have selected. This validation is only offered as a suggestion for you. PLEASE NOTE THAT THE VALIDATION TEXT WILL BE REMOVED WHEN YOU FINALIZE YOUR NOTE. IF YOU WANT TO FAX A PRELIMINARY NOTE YOU WILL NEED TO TOGGLE THIS TO 'NO' IF YOU DO NOT WANT IT IN YOUR FAXED NOTE.

## 2024-08-01 NOTE — TELEPHONE ENCOUNTER
Patient was seen in the ED recently and had medication refilled.  Not appropriate for refill.    An electronic signature was used to authenticate this note  - Denton Beasley MD PGY-2 on 8/1/2024 at 2:35 PM

## 2024-08-01 NOTE — TELEPHONE ENCOUNTER
Patient in office asking for a refill . Orders pended . Please advice .        Which pharmacy does the script need sent to: [facility] on [address] .       No results found for: \"LABA1C\"  No results found for: \"CHOL\", \"TRIG\", \"HDL\", \"LDLDIRECT\"  Lab Results   Component Value Date     11/21/2023    K 4.1 11/21/2023     11/21/2023    CO2 23 11/21/2023    BUN 13 11/21/2023    CREATININE 0.7 11/21/2023    GLUCOSE 107 11/21/2023    CALCIUM 8.6 11/21/2023    BILITOT 0.3 11/21/2023    ALKPHOS 102 11/21/2023    AST 23 11/21/2023    ALT 13 11/21/2023    LABGLOM >60 11/21/2023     Lab Results   Component Value Date    TSH 1.470 07/15/2016    T4FREE 1.09 07/15/2016     Lab Results   Component Value Date    WBC 15.6 (H) 11/21/2023    HGB 16.5 11/21/2023    HCT 50.0 11/21/2023    MCV 91.2 11/21/2023     11/21/2023

## 2024-08-12 ENCOUNTER — OFFICE VISIT (OUTPATIENT)
Dept: FAMILY MEDICINE CLINIC | Age: 44
End: 2024-08-12
Payer: COMMERCIAL

## 2024-08-12 VITALS
BODY MASS INDEX: 23.98 KG/M2 | DIASTOLIC BLOOD PRESSURE: 76 MMHG | WEIGHT: 152.8 LBS | HEART RATE: 75 BPM | SYSTOLIC BLOOD PRESSURE: 110 MMHG | OXYGEN SATURATION: 99 % | TEMPERATURE: 97.9 F | HEIGHT: 67 IN | RESPIRATION RATE: 14 BRPM

## 2024-08-12 DIAGNOSIS — S32.010A CLOSED COMPRESSION FRACTURE OF BODY OF L1 VERTEBRA (HCC): Primary | ICD-10-CM

## 2024-08-12 PROCEDURE — 99214 OFFICE O/P EST MOD 30 MIN: CPT

## 2024-08-12 RX ORDER — ORPHENADRINE CITRATE 100 MG/1
100 TABLET, EXTENDED RELEASE ORAL 2 TIMES DAILY
Qty: 10 TABLET | Refills: 0 | Status: SHIPPED | OUTPATIENT
Start: 2024-08-12 | End: 2024-08-17

## 2024-08-12 RX ORDER — HYDROCODONE BITARTRATE AND ACETAMINOPHEN 5; 325 MG/1; MG/1
1 TABLET ORAL EVERY 6 HOURS PRN
Qty: 12 TABLET | Refills: 0 | Status: SHIPPED | OUTPATIENT
Start: 2024-08-12 | End: 2024-08-15

## 2024-08-12 RX ORDER — KETOROLAC TROMETHAMINE 10 MG/1
10 TABLET, FILM COATED ORAL EVERY 6 HOURS PRN
Qty: 15 TABLET | Refills: 0 | Status: SHIPPED | OUTPATIENT
Start: 2024-08-12 | End: 2024-08-17
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
## 2024-08-14 ASSESSMENT — ENCOUNTER SYMPTOMS: BACK PAIN: 1
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
## 2024-08-15 ENCOUNTER — OFFICE VISIT (OUTPATIENT)
Dept: NEUROSURGERY | Age: 44
End: 2024-08-15

## 2024-08-15 ENCOUNTER — TELEPHONE (OUTPATIENT)
Dept: NEUROSURGERY | Age: 44
End: 2024-08-15

## 2024-08-15 VITALS
WEIGHT: 154 LBS | BODY MASS INDEX: 23.34 KG/M2 | HEART RATE: 65 BPM | DIASTOLIC BLOOD PRESSURE: 79 MMHG | HEIGHT: 68 IN | SYSTOLIC BLOOD PRESSURE: 118 MMHG

## 2024-08-15 DIAGNOSIS — S32.010A CLOSED COMPRESSION FRACTURE OF BODY OF L1 VERTEBRA (HCC): Primary | ICD-10-CM

## 2024-08-15 PROCEDURE — 99213 OFFICE O/P EST LOW 20 MIN: CPT

## 2024-08-15 NOTE — TELEPHONE ENCOUNTER
SURGERY SCHEDULING FORM   [address] [address]      Phone *[phone] *[phone]   Surgical Scheduling Direct Line Phone *[phone] Fax *[phone]      Neville SESAY Ethan   1980   male    [place] [address]              Home Phone: [phone]    Cell Phone:    Telephone Information:   Mobile [phone]          Surgeon: Dr. Salvador   Surgery Date: 09/03/2024  Time: 7:30 am    Procedure: Kyphoplasty of L1    Diagnosis:  Closed compression fracture of L1 vertebra    Important Medical History:       Special Inst/Equip: Position: Prone, [place] Frame, Xray/Flouro    Anesthesia:  Gen            Admission Type:  Same Day     Case Location:      Main OR         Need Preop Cardiac Clearance:       Does Patient have Cardiologist/physician?         Surgery Confirmation #: __________________________________________________    : ________________________   Date: __________________________     Insurance Company Name: [employer]  CPT CODES:38451

## 2024-08-23 NOTE — PROGRESS NOTES
Follow all instructions given by your physician  Do not eat or drink anything after midnight prior to surgery(includes water, chewing gum, mints and ice chips)  Sips of water am of surgery with allowed medications  May brush teeth   Do not smoke or chew tobacco, drink alcoholic beverages or use any illicit drugs for 24 hours prior to surgery  Bring insurance info and photo ID  Bring pertinent paperwork with you from Doctor or surgeons's office  Wear clean comfortable, loose-fitting clothing  No jewelry, piercings, or contact lenses to be worn day of surgery  Case for glasses.  Shower the night before and the morning of surgery with cleansing soap provided or a liquid antibacterial soap, dry with new fresh clean towel after each shower, no lotions, creams or powder.  Clean sheets and pillowcase on bed night before surgery  Bring medications in original bottles, Bring rescue inhalers with you    Do you have a DNR? NO  Please Bring Healthcare Directive or Healthcare Power of  in so we can scan it into your chart.    Our pharmacy has a Meds to Beds program where they will deliver any new prescriptions you may have to your room before you leave. Our Pharmacy will clear it through your insurance; for example (same co pay). This enables you to take your new RX as soon as you need when you get home and avoids stop/wait delays on the way home.  Please have a form of payment with you and have someone designated as your Pharmacy contact with their phone # as you may not feel well or still be under the influence of anesthesia.    Please refer to the SSI-Surgical Site Infection Flyer you hopefully received in the mail-together we can prevent infections; signs and symptoms reviewed.  When discharged be sure you understand how to care for your wound and that you have the Dr./office phone # to call if you have any concerns or questions about your wound.     needed at discharge and someone over 18 to stay with you for

## 2024-08-26 ENCOUNTER — OFFICE VISIT (OUTPATIENT)
Dept: FAMILY MEDICINE CLINIC | Age: 44
End: 2024-08-26

## 2024-08-26 VITALS
RESPIRATION RATE: 20 BRPM | OXYGEN SATURATION: 98 % | WEIGHT: 154.4 LBS | HEIGHT: 68 IN | SYSTOLIC BLOOD PRESSURE: 110 MMHG | DIASTOLIC BLOOD PRESSURE: 72 MMHG | TEMPERATURE: 97.9 F | HEART RATE: 86 BPM | BODY MASS INDEX: 23.4 KG/M2

## 2024-08-26 DIAGNOSIS — K64.4 EXTERNAL HEMORRHOID: ICD-10-CM

## 2024-08-26 DIAGNOSIS — S32.010A CLOSED COMPRESSION FRACTURE OF BODY OF L1 VERTEBRA (HCC): Primary | ICD-10-CM

## 2024-08-26 DIAGNOSIS — K59.00 CONSTIPATION, UNSPECIFIED CONSTIPATION TYPE: ICD-10-CM

## 2024-08-26 PROCEDURE — 99214 OFFICE O/P EST MOD 30 MIN: CPT

## 2024-08-26 RX ORDER — CYCLOBENZAPRINE HCL 10 MG
10 TABLET ORAL 3 TIMES DAILY PRN
Qty: 30 TABLET | Refills: 0 | Status: SHIPPED | OUTPATIENT
Start: 2024-08-26 | End: 2024-09-05

## 2024-08-26 RX ORDER — POLYETHYLENE GLYCOL 3350 17 G/17G
17 POWDER, FOR SOLUTION ORAL DAILY
Qty: 510 G | Refills: 0 | Status: SHIPPED | OUTPATIENT
Start: 2024-08-26 | End: 2024-09-25

## 2024-08-26 NOTE — PROGRESS NOTES
S: 44 y.o. male with   Chief Complaint   Patient presents with    Follow-up     Medication follow up        HPI: please see resident note for HPI and ROS.    BP Readings from Last 3 Encounters:   08/26/24 110/72   08/15/24 118/79   08/12/24 110/76     Wt Readings from Last 3 Encounters:   08/26/24 70 kg (154 lb 6.4 oz)   08/15/24 69.9 kg (154 lb)   08/12/24 69.3 kg (152 lb 12.8 oz)       O: VS:  height is 1.727 m (5' 8\") and weight is 70 kg (154 lb 6.4 oz). His oral temperature is 97.9 °F (36.6 °C). His blood pressure is 110/72 and his pulse is 86. His respiration is 20 and oxygen saturation is 98%.   AAO/NAD, appropriate affect for mood  CV:  RRR, no murmur  Resp: CTAB     Diagnosis Orders   1. Closed compression fracture of body of L1 vertebra (HCC)  Jaime Rodríguez MD, Pain Management, [place]    cyclobenzaprine (FLEXERIL) 10 MG tablet      2. Constipation, unspecified constipation type  polyethylene glycol (GLYCOLAX) 17 GM/SCOOP powder      3. External hemorrhoid  polyethylene glycol (GLYCOLAX) 17 GM/SCOOP powder          Plan:  Please refer to resident note for full plan.    44 year old male presents to the office for follow up on compression fracture.  Patient has a history of an L1 compression fracture planning for kyphoplasty through neurosurgery.  Would continue conservative management including heat, will add Flexeril as a muscle relaxer to assist with pain.  Will send referral to pain management for compression fracture pain management.    Constipation with external hemorrhoid: Will plan to treat constipation with polyethylene glycol as a stool softener to assist with regular soft bowel movements and prevention of external hemorrhoid flares.  Patient is agreeable.    Health Maintenance Due   Topic Date Due    Pneumococcal 0-64 years Vaccine (1 of 2 - PCV) Never done    Varicella vaccine (1 of 2 - 13+ 2-dose series) Never done    HIV screen  Never done    Hepatitis C screen  Never done    Hepatitis B
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
## 2024-09-03 ENCOUNTER — ANESTHESIA EVENT (OUTPATIENT)
Dept: OPERATING ROOM | Age: 44
End: 2024-09-03

## 2024-09-03 ENCOUNTER — APPOINTMENT (OUTPATIENT)
Dept: GENERAL RADIOLOGY | Age: 44
End: 2024-09-03
Attending: NEUROLOGICAL SURGERY

## 2024-09-03 ENCOUNTER — HOSPITAL ENCOUNTER (OUTPATIENT)
Age: 44
Setting detail: OUTPATIENT SURGERY
Discharge: HOME | End: 2024-09-03
Attending: NEUROLOGICAL SURGERY | Admitting: NEUROLOGICAL SURGERY

## 2024-09-03 ENCOUNTER — ANESTHESIA (OUTPATIENT)
Dept: OPERATING ROOM | Age: 44
End: 2024-09-03

## 2024-09-03 VITALS
BODY MASS INDEX: 24.17 KG/M2 | SYSTOLIC BLOOD PRESSURE: 125 MMHG | RESPIRATION RATE: 16 BRPM | OXYGEN SATURATION: 100 % | HEART RATE: 80 BPM | TEMPERATURE: 97.8 F | WEIGHT: 154 LBS | DIASTOLIC BLOOD PRESSURE: 75 MMHG | HEIGHT: 67 IN

## 2024-09-03 DIAGNOSIS — Z98.890 STATUS POST KYPHOPLASTY: Primary | ICD-10-CM

## 2024-09-03 LAB
ABO: NORMAL
ANTIBODY SCREEN: NORMAL
RH FACTOR: NORMAL

## 2024-09-03 PROCEDURE — 3700000001 HC ADD 15 MINUTES (ANESTHESIA): Performed by: NEUROLOGICAL SURGERY

## 2024-09-03 PROCEDURE — 7100000001 HC PACU RECOVERY - ADDTL 15 MIN: Performed by: NEUROLOGICAL SURGERY

## 2024-09-03 PROCEDURE — 86901 BLOOD TYPING SEROLOGIC RH(D): CPT

## 2024-09-03 PROCEDURE — 2580000003 HC RX 258: Performed by: PHYSICIAN ASSISTANT

## 2024-09-03 PROCEDURE — 3600000012 HC SURGERY LEVEL 2 ADDTL 15MIN: Performed by: NEUROLOGICAL SURGERY

## 2024-09-03 PROCEDURE — 6360000002 HC RX W HCPCS

## 2024-09-03 PROCEDURE — 86900 BLOOD TYPING SEROLOGIC ABO: CPT

## 2024-09-03 PROCEDURE — 2500000003 HC RX 250 WO HCPCS

## 2024-09-03 PROCEDURE — 86850 RBC ANTIBODY SCREEN: CPT

## 2024-09-03 PROCEDURE — 22514 PERQ VERTEBRAL AUGMENTATION: CPT | Performed by: NEUROLOGICAL SURGERY

## 2024-09-03 PROCEDURE — 36415 COLL VENOUS BLD VENIPUNCTURE: CPT

## 2024-09-03 PROCEDURE — 2720000010 HC SURG SUPPLY STERILE: Performed by: NEUROLOGICAL SURGERY

## 2024-09-03 PROCEDURE — C1713 ANCHOR/SCREW BN/BN,TIS/BN: HCPCS | Performed by: NEUROLOGICAL SURGERY

## 2024-09-03 PROCEDURE — 72100 X-RAY EXAM L-S SPINE 2/3 VWS: CPT

## 2024-09-03 PROCEDURE — 6360000002 HC RX W HCPCS: Performed by: PHYSICIAN ASSISTANT

## 2024-09-03 PROCEDURE — 6370000000 HC RX 637 (ALT 250 FOR IP): Performed by: PHYSICIAN ASSISTANT

## 2024-09-03 PROCEDURE — 7100000010 HC PHASE II RECOVERY - FIRST 15 MIN: Performed by: NEUROLOGICAL SURGERY

## 2024-09-03 PROCEDURE — 3600000002 HC SURGERY LEVEL 2 BASE: Performed by: NEUROLOGICAL SURGERY

## 2024-09-03 PROCEDURE — 2709999900 HC NON-CHARGEABLE SUPPLY: Performed by: NEUROLOGICAL SURGERY

## 2024-09-03 PROCEDURE — 3700000000 HC ANESTHESIA ATTENDED CARE: Performed by: NEUROLOGICAL SURGERY

## 2024-09-03 PROCEDURE — C1894 INTRO/SHEATH, NON-LASER: HCPCS | Performed by: NEUROLOGICAL SURGERY

## 2024-09-03 PROCEDURE — 7100000011 HC PHASE II RECOVERY - ADDTL 15 MIN: Performed by: NEUROLOGICAL SURGERY

## 2024-09-03 PROCEDURE — 6360000004 HC RX CONTRAST MEDICATION: Performed by: NEUROLOGICAL SURGERY

## 2024-09-03 PROCEDURE — 7100000000 HC PACU RECOVERY - FIRST 15 MIN: Performed by: NEUROLOGICAL SURGERY

## 2024-09-03 RX ORDER — NALOXONE HYDROCHLORIDE 0.4 MG/ML
INJECTION, SOLUTION INTRAMUSCULAR; INTRAVENOUS; SUBCUTANEOUS PRN
Status: DISCONTINUED | OUTPATIENT
Start: 2024-09-03 | End: 2024-09-03 | Stop reason: HOSPADM

## 2024-09-03 RX ORDER — LIDOCAINE HCL/PF 100 MG/5ML
SYRINGE (ML) INJECTION PRN
Status: DISCONTINUED | OUTPATIENT
Start: 2024-09-03 | End: 2024-09-03 | Stop reason: SDUPTHER

## 2024-09-03 RX ORDER — FENTANYL CITRATE 50 UG/ML
25 INJECTION, SOLUTION INTRAMUSCULAR; INTRAVENOUS EVERY 5 MIN PRN
Status: DISCONTINUED | OUTPATIENT
Start: 2024-09-03 | End: 2024-09-03 | Stop reason: HOSPADM

## 2024-09-03 RX ORDER — MEPERIDINE HYDROCHLORIDE 25 MG/ML
12.5 INJECTION INTRAMUSCULAR; INTRAVENOUS; SUBCUTANEOUS EVERY 5 MIN PRN
Status: DISCONTINUED | OUTPATIENT
Start: 2024-09-03 | End: 2024-09-03 | Stop reason: HOSPADM

## 2024-09-03 RX ORDER — PROPOFOL 10 MG/ML
INJECTION, EMULSION INTRAVENOUS PRN
Status: DISCONTINUED | OUTPATIENT
Start: 2024-09-03 | End: 2024-09-03 | Stop reason: SDUPTHER

## 2024-09-03 RX ORDER — SODIUM CHLORIDE 0.9 % (FLUSH) 0.9 %
5-40 SYRINGE (ML) INJECTION EVERY 12 HOURS SCHEDULED
Status: DISCONTINUED | OUTPATIENT
Start: 2024-09-03 | End: 2024-09-03 | Stop reason: HOSPADM

## 2024-09-03 RX ORDER — SODIUM CHLORIDE 9 MG/ML
INJECTION, SOLUTION INTRAVENOUS PRN
Status: DISCONTINUED | OUTPATIENT
Start: 2024-09-03 | End: 2024-09-03 | Stop reason: HOSPADM

## 2024-09-03 RX ORDER — DEXAMETHASONE SODIUM PHOSPHATE 10 MG/ML
INJECTION, EMULSION INTRAMUSCULAR; INTRAVENOUS PRN
Status: DISCONTINUED | OUTPATIENT
Start: 2024-09-03 | End: 2024-09-03 | Stop reason: SDUPTHER

## 2024-09-03 RX ORDER — ONDANSETRON 2 MG/ML
4 INJECTION INTRAMUSCULAR; INTRAVENOUS
Status: DISCONTINUED | OUTPATIENT
Start: 2024-09-03 | End: 2024-09-03 | Stop reason: HOSPADM

## 2024-09-03 RX ORDER — SODIUM CHLORIDE 0.9 % (FLUSH) 0.9 %
5-40 SYRINGE (ML) INJECTION PRN
Status: DISCONTINUED | OUTPATIENT
Start: 2024-09-03 | End: 2024-09-03 | Stop reason: HOSPADM

## 2024-09-03 RX ORDER — FENTANYL CITRATE 50 UG/ML
50 INJECTION, SOLUTION INTRAMUSCULAR; INTRAVENOUS EVERY 5 MIN PRN
Status: DISCONTINUED | OUTPATIENT
Start: 2024-09-03 | End: 2024-09-03 | Stop reason: HOSPADM

## 2024-09-03 RX ORDER — FENTANYL CITRATE 50 UG/ML
INJECTION, SOLUTION INTRAMUSCULAR; INTRAVENOUS PRN
Status: DISCONTINUED | OUTPATIENT
Start: 2024-09-03 | End: 2024-09-03 | Stop reason: SDUPTHER

## 2024-09-03 RX ORDER — HYDROCODONE BITARTRATE AND ACETAMINOPHEN 5; 325 MG/1; MG/1
1 TABLET ORAL EVERY 6 HOURS PRN
Qty: 28 TABLET | Refills: 0 | Status: SHIPPED | OUTPATIENT
Start: 2024-09-03 | End: 2024-09-10

## 2024-09-03 RX ORDER — MIDAZOLAM HYDROCHLORIDE 1 MG/ML
INJECTION INTRAMUSCULAR; INTRAVENOUS PRN
Status: DISCONTINUED | OUTPATIENT
Start: 2024-09-03 | End: 2024-09-03 | Stop reason: SDUPTHER

## 2024-09-03 RX ORDER — SUCCINYLCHOLINE/SOD CL,ISO/PF 200MG/10ML
SYRINGE (ML) INTRAVENOUS PRN
Status: DISCONTINUED | OUTPATIENT
Start: 2024-09-03 | End: 2024-09-03 | Stop reason: SDUPTHER

## 2024-09-03 RX ORDER — TRANEXAMIC ACID 100 MG/ML
INJECTION, SOLUTION INTRAVENOUS PRN
Status: DISCONTINUED | OUTPATIENT
Start: 2024-09-03 | End: 2024-09-03 | Stop reason: SDUPTHER

## 2024-09-03 RX ORDER — ONDANSETRON 2 MG/ML
INJECTION INTRAMUSCULAR; INTRAVENOUS PRN
Status: DISCONTINUED | OUTPATIENT
Start: 2024-09-03 | End: 2024-09-03 | Stop reason: SDUPTHER

## 2024-09-03 RX ORDER — ROCURONIUM BROMIDE 10 MG/ML
INJECTION, SOLUTION INTRAVENOUS PRN
Status: DISCONTINUED | OUTPATIENT
Start: 2024-09-03 | End: 2024-09-03 | Stop reason: SDUPTHER

## 2024-09-03 RX ORDER — HYDROCODONE BITARTRATE AND ACETAMINOPHEN 5; 325 MG/1; MG/1
1 TABLET ORAL EVERY 4 HOURS PRN
Status: DISCONTINUED | OUTPATIENT
Start: 2024-09-03 | End: 2024-09-03 | Stop reason: HOSPADM

## 2024-09-03 RX ADMIN — ROCURONIUM BROMIDE 40 MG: 10 INJECTION INTRAVENOUS at 11:11

## 2024-09-03 RX ADMIN — PROPOFOL 200 MG: 10 INJECTION, EMULSION INTRAVENOUS at 10:55

## 2024-09-03 RX ADMIN — WATER 2000 MG: 1 INJECTION INTRAMUSCULAR; INTRAVENOUS; SUBCUTANEOUS at 11:06

## 2024-09-03 RX ADMIN — SODIUM CHLORIDE: 9 INJECTION, SOLUTION INTRAVENOUS at 09:04

## 2024-09-03 RX ADMIN — Medication 100 MG: at 10:55

## 2024-09-03 RX ADMIN — ROCURONIUM BROMIDE 10 MG: 10 INJECTION INTRAVENOUS at 10:55

## 2024-09-03 RX ADMIN — HYDROCODONE BITARTRATE AND ACETAMINOPHEN 1 TABLET: 5; 325 TABLET ORAL at 12:18

## 2024-09-03 RX ADMIN — FENTANYL CITRATE 50 MCG: 50 INJECTION, SOLUTION INTRAMUSCULAR; INTRAVENOUS at 10:55

## 2024-09-03 RX ADMIN — ONDANSETRON 4 MG: 2 INJECTION INTRAMUSCULAR; INTRAVENOUS at 11:47

## 2024-09-03 RX ADMIN — FENTANYL CITRATE 50 MCG: 50 INJECTION, SOLUTION INTRAMUSCULAR; INTRAVENOUS at 12:10

## 2024-09-03 RX ADMIN — ROCURONIUM BROMIDE 10 MG: 10 INJECTION INTRAVENOUS at 11:30

## 2024-09-03 RX ADMIN — MIDAZOLAM 2 MG: 1 INJECTION INTRAMUSCULAR; INTRAVENOUS at 10:52

## 2024-09-03 RX ADMIN — Medication 140 MG: at 10:55

## 2024-09-03 RX ADMIN — SUGAMMADEX 200 MG: 100 INJECTION, SOLUTION INTRAVENOUS at 12:01

## 2024-09-03 RX ADMIN — DEXAMETHASONE SODIUM PHOSPHATE 10 MG: 10 INJECTION, EMULSION INTRAMUSCULAR; INTRAVENOUS at 11:02

## 2024-09-03 RX ADMIN — TRANEXAMIC ACID 1000 MG: 100 INJECTION, SOLUTION INTRAVENOUS at 11:10

## 2024-09-03 ASSESSMENT — PAIN DESCRIPTION - LOCATION
LOCATION: BACK
LOCATION: BACK

## 2024-09-03 ASSESSMENT — PAIN DESCRIPTION - PAIN TYPE
TYPE: SURGICAL PAIN
TYPE: ACUTE PAIN

## 2024-09-03 ASSESSMENT — PAIN SCALES - GENERAL
PAINLEVEL_OUTOF10: 6
PAINLEVEL_OUTOF10: 7
PAINLEVEL_OUTOF10: 7
PAINLEVEL_OUTOF10: 6
PAINLEVEL_OUTOF10: 7
PAINLEVEL_OUTOF10: 6
PAINLEVEL_OUTOF10: 5

## 2024-09-03 ASSESSMENT — PAIN DESCRIPTION - DESCRIPTORS
DESCRIPTORS: ACHING
DESCRIPTORS: ACHING;TIGHTNESS
DESCRIPTORS: TIGHTNESS

## 2024-09-03 ASSESSMENT — PAIN - FUNCTIONAL ASSESSMENT: PAIN_FUNCTIONAL_ASSESSMENT: PREVENTS OR INTERFERES SOME ACTIVE ACTIVITIES AND ADLS

## 2024-09-03 ASSESSMENT — PAIN DESCRIPTION - ORIENTATION
ORIENTATION: MID
ORIENTATION: MID

## 2024-09-03 ASSESSMENT — PAIN DESCRIPTION - FREQUENCY: FREQUENCY: CONTINUOUS

## 2024-09-03 ASSESSMENT — LIFESTYLE VARIABLES: SMOKING_STATUS: 1

## 2024-09-03 ASSESSMENT — PAIN DESCRIPTION - ONSET: ONSET: ON-GOING

## 2024-09-03 NOTE — PROGRESS NOTES
Pt returned to [facility] room 9. Vitals and assessment as charted. 0.9 infusing,  to count from PACU. Pt has crackers and water. Family at the bedside. Pt and family verbalized understanding of discharge criteria and call light use. Call light in reach.

## 2024-09-03 NOTE — BRIEF OP NOTE
Brief Postoperative Note      Patient: Neville Long  YOB: 1980  MRN: [id number]    Date of Procedure: 9/3/2024    Pre-Op Diagnosis Codes:      * Closed compression fracture of L1 vertebra, initial encounter ([facility]) [S32.010A]    Post-Op Diagnosis: Same       Procedure(s):  KYPHOPLASTY OF L1    Surgeon(s):  Ale Salvador MD    Assistant:  Physician Assistant: Grady Polo PA-C    Anesthesia: General    Estimated Blood Loss (mL): Minimal    Complications: None    Specimens:   * No specimens in log *    Implants:  * No implants in log *      Drains: * No LDAs found *    Findings:  Infection Present At Time Of Surgery (PATOS) (choose all levels that have infection present):  No infection present  Other Findings: Post kyphoplasty lumbar 1    Electronically signed by Grady Polo PA-C on 9/3/2024 at 12:04 PM
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
## 2024-09-03 NOTE — ANESTHESIA POSTPROCEDURE EVALUATION
Department of Anesthesiology  Postprocedure Note    Patient: Neville Long  MRN: [id number]  YOB: 1980  Date of evaluation: 9/3/2024    Procedure Summary       Date: 09/03/24 Room / Location: [facility] / [facility]    Anesthesia Start: 1051 Anesthesia Stop: 1210    Procedure: KYPHOPLASTY OF L1 Diagnosis:       Closed compression fracture of L1 vertebra, initial encounter ([facility])      (Closed compression fracture of L1 vertebra, initial encounter ([facility]) [S32.010A])    Surgeons: Ale Salvador MD Responsible Provider: Luis Amezquita DO    Anesthesia Type: general ASA Status: 2            Anesthesia Type: No value filed.    Thad Phase I: Thad Score: 10    Thad Phase II: Thad Score: 10    Anesthesia Post Evaluation    Patient location during evaluation: PACU  Patient participation: complete - patient participated  Level of consciousness: awake and alert  Pain score: 3  Airway patency: patent  Nausea & Vomiting: no nausea and no vomiting  Cardiovascular status: hemodynamically stable and blood pressure returned to baseline  Respiratory status: spontaneous ventilation, room air and acceptable  Hydration status: stable  Pain management: adequate and satisfactory to patient    No notable events documented.
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
## 2024-09-03 NOTE — DISCHARGE INSTRUCTIONS
Keep insertion site clean and dry, maintaining Steri-Strips for 5 to 7 days.    Residual Steri-Strips may be removed after 7 days    Take pain medications as needed with application of ice at or below the insertion site to provide any additional relief.    A follow-up with neurosurgery as scheduled 12 days from the date of the procedure for incision inspection

## 2024-09-03 NOTE — PROGRESS NOTES
1208- pt to pacu, resp easy and unlabored, VSS, pt appears in no acute distress, pt medicated for pain per CRNA  1218- norco given  1230- pt resting in bed with eyes opened, talking with RN, resp easy and unlabored, VSS, pt appears in no acute distress  1248- pt meets criteria for discharge from pacu, pt transported to [facility] in stable condition

## 2024-09-03 NOTE — PROGRESS NOTES
Patient oriented to Same Day department and admitted to Same Day Surgery room 9.   Patient verbalized approval for first name, last initial with physician name on unit whiteboard.     Plan of care reviewed with patient.   Patient room whiteboard filled out and discussed with patient and responsible adult.       Call light in reach.   Bed in lowest position, locked, with one bed rail up.   SCDs and warming blanket in place.  Appropriate arm bands on patient.   Bathroom offered.   All questions and concerns of patient addressed.        Meds to Beds:   Patient informed of [facility] Meds to Beds program during admission. Patient has declined use of program.

## 2024-09-03 NOTE — PROGRESS NOTES
Pt has met discharge criteria and states he is ready for discharge to home. IV removed, gauze and tape applied. Dressed in own clothes and personal belongings gathered. Discharge instructions (with opioid medication education information) given to pt and family; pt and family verbalized understanding of discharge instructions, prescriptions and follow up appointments. Pt transported to discharge lobby by [facility] staff.

## 2024-09-16 ENCOUNTER — OFFICE VISIT (OUTPATIENT)
Dept: NEUROSURGERY | Age: 44
End: 2024-09-16

## 2024-09-16 VITALS
SYSTOLIC BLOOD PRESSURE: 113 MMHG | BODY MASS INDEX: 24.12 KG/M2 | DIASTOLIC BLOOD PRESSURE: 78 MMHG | WEIGHT: 154 LBS | HEART RATE: 92 BPM

## 2024-09-16 DIAGNOSIS — Z98.890 STATUS POST KYPHOPLASTY: Primary | ICD-10-CM

## 2024-09-16 PROCEDURE — 99024 POSTOP FOLLOW-UP VISIT: CPT | Performed by: PHYSICIAN ASSISTANT

## 2024-11-15 ENCOUNTER — HOSPITAL ENCOUNTER (EMERGENCY)
Age: 44
Discharge: HOME | End: 2024-11-15
Payer: COMMERCIAL

## 2024-11-15 VITALS
WEIGHT: 163 LBS | OXYGEN SATURATION: 97 % | RESPIRATION RATE: 20 BRPM | TEMPERATURE: 101.5 F | HEART RATE: 113 BPM | SYSTOLIC BLOOD PRESSURE: 127 MMHG | BODY MASS INDEX: 25.53 KG/M2 | DIASTOLIC BLOOD PRESSURE: 73 MMHG

## 2024-11-15 DIAGNOSIS — J02.9 ACUTE PHARYNGITIS, UNSPECIFIED ETIOLOGY: Primary | ICD-10-CM

## 2024-11-15 LAB
FLUAV RNA RESP QL NAA+PROBE: NOT DETECTED
FLUBV RNA RESP QL NAA+PROBE: NOT DETECTED
S PYO AG THROAT QL: NEGATIVE
S PYO THROAT QL CULT: NORMAL
SARS-COV-2 RNA RESP QL NAA+PROBE: NOT DETECTED

## 2024-11-15 PROCEDURE — 6370000000 HC RX 637 (ALT 250 FOR IP): Performed by: PHYSICIAN ASSISTANT

## 2024-11-15 PROCEDURE — 87880 STREP A ASSAY W/OPTIC: CPT

## 2024-11-15 PROCEDURE — 87070 CULTURE OTHR SPECIMN AEROBIC: CPT

## 2024-11-15 PROCEDURE — 99283 EMERGENCY DEPT VISIT LOW MDM: CPT

## 2024-11-15 PROCEDURE — 87636 SARSCOV2 & INF A&B AMP PRB: CPT

## 2024-11-15 RX ORDER — AZITHROMYCIN 500 MG/1
500 TABLET, FILM COATED ORAL DAILY
Qty: 5 TABLET | Refills: 0 | Status: SHIPPED | OUTPATIENT
Start: 2024-11-15 | End: 2024-11-20

## 2024-11-15 RX ORDER — ACETAMINOPHEN 500 MG
1000 TABLET ORAL ONCE
Status: COMPLETED | OUTPATIENT
Start: 2024-11-15 | End: 2024-11-15

## 2024-11-15 RX ADMIN — ACETAMINOPHEN 1000 MG: 500 TABLET ORAL at 09:08

## 2024-11-15 ASSESSMENT — PAIN SCALES - GENERAL
PAINLEVEL_OUTOF10: 8
PAINLEVEL_OUTOF10: 7

## 2024-11-15 ASSESSMENT — PAIN DESCRIPTION - FREQUENCY: FREQUENCY: CONTINUOUS

## 2024-11-15 ASSESSMENT — PAIN - FUNCTIONAL ASSESSMENT: PAIN_FUNCTIONAL_ASSESSMENT: 0-10

## 2024-11-15 ASSESSMENT — PAIN DESCRIPTION - ONSET: ONSET: ON-GOING

## 2024-11-15 ASSESSMENT — PAIN DESCRIPTION - LOCATION: LOCATION: THROAT

## 2024-11-15 ASSESSMENT — PAIN DESCRIPTION - DESCRIPTORS: DESCRIPTORS: SORE

## 2024-11-15 ASSESSMENT — PAIN DESCRIPTION - PAIN TYPE: TYPE: ACUTE PAIN

## 2024-11-15 NOTE — ED PROVIDER NOTES
and oriented to person, place, and time.   Psychiatric:         Mood and Affect: Mood normal.         Behavior: Behavior normal.           MEDICAL DECISION MAKING:  Neville Long is a 44 y.o. male who presents to the emergency dept  with sore throat fever runny nose.  He states he has had a lot of drainage she has been coughing up.  He started getting ill last night.  He states mostly his throat is very sore and it hurts to swallow.  He has no difficulty breathing no chest pain or shortness of breath no cough no nausea or vomiting.  He has had exposure to strep pharyngitis he has 3 other members of his family that currently have it.  No chest pressure.  No shortness of breath.  Patient's not take anything for symptoms at this.  Patient has a very erythematous pharynx with exudate he does have anterior lymphadenopathy nasal passages are clear.  Lungs sound clear.  He is mentating at baseline.  Patient is tachycardic with no other extrasystoles.  Patient's strep COVID and flu actually came back negative.  Per exam I feel patient likely has a high chance of strep even in light of negative test.  I have discussed this with the patient he is also had positive exposure in his home.  Using shared decision making have decided to go ahead and treated with antibiotics and send the culture.  Agreeable to this plan of care.      Functioning independently as a Mid-Level Practitioner, I have fully participated in the care of this patient and I have reviewed and agree with all pertinent clinical information including history, physical exam, and plan. I have also reviewed and agree with the medications, allergies and past medical history section for this patient.    Nurses Notes reviewed and I agree except as noted in the HPI.      DIAGNOSTIC RESULTS    RADIOLOGY:   No orders to display       LABS:  Labs Reviewed   COVID-19 & INFLUENZA COMBO   CULTURE, THROAT    Narrative:     Source: Specimen not received       Site:

## 2024-11-15 NOTE — ED NOTES
Pt presents to the ER for a sore throat and cold like symptoms. Pt states this all started yesterday and this morning he can hardly swallow. Pt has not had any Tylenol this morning. Pts temp is 101.5 orally.

## 2024-11-17 LAB — BACTERIA THROAT AEROBE CULT: NORMAL

## 2024-11-18 ENCOUNTER — TELEPHONE (OUTPATIENT)
Dept: FAMILY MEDICINE CLINIC | Age: 44
End: 2024-11-18

## (undated) DEVICE — APPLICATOR PREP 26ML 0.7% IOD POVACRYLEX 74% ISO ALC ST

## (undated) DEVICE — SUTURE VCRL + SZ 2-0 L27IN ABSRB WHT SH 1/2 CIR TAPERCUT VCP417H

## (undated) DEVICE — PACK-MAJOR

## (undated) DEVICE — TIP COVER ACCESSORY

## (undated) DEVICE — CURETTE A13A SIZE 2 T-TIP: Brand: KYPHON® EXPRESS ™ CURETTE

## (undated) DEVICE — ARM DRAPE

## (undated) DEVICE — BONE CEMENT CT01B KYPHON VUE W MIXER: Brand: KYPHON VUE BONE CEMENT AND KYPHON MIXER

## (undated) DEVICE — TROCAR: Brand: KII FIOS FIRST ENTRY

## (undated) DEVICE — SOLUTION SCRB 4OZ 7.5% POVIDONE IOD ANTIMIC BTL

## (undated) DEVICE — TUBING INSUFFLATOR HEAT HUMIDIFIED SMK EVAC SET PNEUMOCLEAR

## (undated) DEVICE — GENERAL LAPAROSCOPY-LF: Brand: MEDLINE INDUSTRIES, INC.

## (undated) DEVICE — PREMIUM DRY TRAY LF: Brand: MEDLINE INDUSTRIES, INC.

## (undated) DEVICE — STRIP,CLOSURE,WOUND,MEDI-STRIP,1/2X4: Brand: MEDLINE

## (undated) DEVICE — GLOVE SURG SZ 85 L12IN THK75MIL DK GRN LTX FREE

## (undated) DEVICE — BLADE,CARBON-STEEL,15,STRL,DISPOSABLE,TB: Brand: MEDLINE

## (undated) DEVICE — PAD PT POS 36 IN SURGYPAD DISP

## (undated) DEVICE — COVERS JACKSON TABLE ULTRA COMFORT MED

## (undated) DEVICE — GLOVE ORANGE PI 7 1/2   MSG9075

## (undated) DEVICE — LIQUIBAND RAPID ADHESIVE 36/CS 0.8ML: Brand: MEDLINE

## (undated) DEVICE — ELECTRO LUBE IS A SINGLE PATIENT USE DEVICE THAT IS INTENDED TO BE USED ON ELECTROSURGICAL ELECTRODES TO REDUCE STICKING.: Brand: KEY SURGICAL ELECTRO LUBE

## (undated) DEVICE — SOLUTION PREP PAINT POV IOD FOR SKIN MUCOUS MEM

## (undated) DEVICE — SUTURE V-LOC 180 SZ 2-0 L9IN ABSRB GRN L27MM GS-22 1/2 CIR VLOCL2145

## (undated) DEVICE — COLUMN DRAPE

## (undated) DEVICE — GLOVE ORANGE PI 8   MSG9080

## (undated) DEVICE — 35 ML SYRINGE LUER-LOCK TIP: Brand: MONOJECT

## (undated) DEVICE — KIT KEX152EB-CDS- 15/2 FF WITH CDS: Brand: KYPHPAK® FIRST FRACTURE TRAY

## (undated) DEVICE — KYPHOPLASTY: Brand: MEDLINE INDUSTRIES, INC.

## (undated) DEVICE — SPONGE,NEURO,1"X3",XR,STRL,LF,10/PK: Brand: MEDLINE

## (undated) DEVICE — SEAL